# Patient Record
Sex: MALE | Employment: FULL TIME | URBAN - METROPOLITAN AREA
[De-identification: names, ages, dates, MRNs, and addresses within clinical notes are randomized per-mention and may not be internally consistent; named-entity substitution may affect disease eponyms.]

---

## 2018-12-20 ENCOUNTER — HOSPITAL ENCOUNTER (INPATIENT)
Age: 48
LOS: 3 days | Discharge: HOME | DRG: 871 | End: 2018-12-23
Attending: EMERGENCY MEDICINE | Admitting: HOSPITALIST
Payer: SELF-PAY

## 2018-12-20 ENCOUNTER — APPOINTMENT (OUTPATIENT)
Dept: GENERAL RADIOLOGY | Age: 48
DRG: 871 | End: 2018-12-20
Attending: EMERGENCY MEDICINE
Payer: SELF-PAY

## 2018-12-20 DIAGNOSIS — J18.9 COMMUNITY ACQUIRED PNEUMONIA OF RIGHT LOWER LOBE OF LUNG: Primary | ICD-10-CM

## 2018-12-20 LAB
ALBUMIN SERPL-MCNC: 3.1 G/DL (ref 3.5–5)
ALBUMIN/GLOB SERPL: 0.7 {RATIO} (ref 1.1–2.2)
ALP SERPL-CCNC: 109 U/L (ref 45–117)
ALT SERPL-CCNC: 37 U/L (ref 12–78)
ANION GAP SERPL CALC-SCNC: 7 MMOL/L (ref 5–15)
APPEARANCE UR: ABNORMAL
AST SERPL-CCNC: 29 U/L (ref 15–37)
BACTERIA URNS QL MICRO: NEGATIVE /HPF
BASOPHILS # BLD: 0 K/UL (ref 0–0.1)
BASOPHILS NFR BLD: 0 % (ref 0–1)
BILIRUB SERPL-MCNC: 0.8 MG/DL (ref 0.2–1)
BILIRUB UR QL: NEGATIVE
BUN SERPL-MCNC: 14 MG/DL (ref 6–20)
BUN/CREAT SERPL: 11 (ref 12–20)
CALCIUM SERPL-MCNC: 8.8 MG/DL (ref 8.5–10.1)
CHLORIDE SERPL-SCNC: 100 MMOL/L (ref 97–108)
CO2 SERPL-SCNC: 24 MMOL/L (ref 21–32)
COLOR UR: ABNORMAL
COMMENT, HOLDF: NORMAL
CREAT SERPL-MCNC: 1.29 MG/DL (ref 0.7–1.3)
DIFFERENTIAL METHOD BLD: ABNORMAL
EOSINOPHIL # BLD: 0 K/UL (ref 0–0.4)
EOSINOPHIL NFR BLD: 0 % (ref 0–7)
EPITH CASTS URNS QL MICRO: ABNORMAL /LPF
ERYTHROCYTE [DISTWIDTH] IN BLOOD BY AUTOMATED COUNT: 12.1 % (ref 11.5–14.5)
GLOBULIN SER CALC-MCNC: 4.7 G/DL (ref 2–4)
GLUCOSE SERPL-MCNC: 146 MG/DL (ref 65–100)
GLUCOSE UR STRIP.AUTO-MCNC: NEGATIVE MG/DL
HCT VFR BLD AUTO: 41.2 % (ref 36.6–50.3)
HGB BLD-MCNC: 13.7 G/DL (ref 12.1–17)
HGB UR QL STRIP: NEGATIVE
IMM GRANULOCYTES # BLD: 0 K/UL
IMM GRANULOCYTES NFR BLD AUTO: 0 %
KETONES UR QL STRIP.AUTO: NEGATIVE MG/DL
LEUKOCYTE ESTERASE UR QL STRIP.AUTO: NEGATIVE
LIPASE SERPL-CCNC: 101 U/L (ref 73–393)
LYMPHOCYTES # BLD: 0.7 K/UL (ref 0.8–3.5)
LYMPHOCYTES NFR BLD: 6 % (ref 12–49)
MCH RBC QN AUTO: 29.6 PG (ref 26–34)
MCHC RBC AUTO-ENTMCNC: 33.3 G/DL (ref 30–36.5)
MCV RBC AUTO: 89 FL (ref 80–99)
MONOCYTES # BLD: 0.8 K/UL (ref 0–1)
MONOCYTES NFR BLD: 7 % (ref 5–13)
NEUTS BAND NFR BLD MANUAL: 10 % (ref 0–6)
NEUTS SEG # BLD: 9.9 K/UL (ref 1.8–8)
NEUTS SEG NFR BLD: 77 % (ref 32–75)
NITRITE UR QL STRIP.AUTO: NEGATIVE
NRBC # BLD: 0 K/UL (ref 0–0.01)
NRBC BLD-RTO: 0 PER 100 WBC
PH UR STRIP: 7.5 [PH] (ref 5–8)
PLATELET # BLD AUTO: 177 K/UL (ref 150–400)
PMV BLD AUTO: 10.1 FL (ref 8.9–12.9)
POTASSIUM SERPL-SCNC: 4 MMOL/L (ref 3.5–5.1)
PROT SERPL-MCNC: 7.8 G/DL (ref 6.4–8.2)
PROT UR STRIP-MCNC: 30 MG/DL
RBC # BLD AUTO: 4.63 M/UL (ref 4.1–5.7)
RBC #/AREA URNS HPF: ABNORMAL /HPF (ref 0–5)
RBC MORPH BLD: ABNORMAL
SAMPLES BEING HELD,HOLD: NORMAL
SODIUM SERPL-SCNC: 131 MMOL/L (ref 136–145)
SP GR UR REFRACTOMETRY: 1.01 (ref 1–1.03)
TROPONIN I SERPL-MCNC: <0.05 NG/ML
UR CULT HOLD, URHOLD: NORMAL
UROBILINOGEN UR QL STRIP.AUTO: 1 EU/DL (ref 0.2–1)
WBC # BLD AUTO: 11.4 K/UL (ref 4.1–11.1)
WBC URNS QL MICRO: ABNORMAL /HPF (ref 0–4)

## 2018-12-20 PROCEDURE — 83690 ASSAY OF LIPASE: CPT

## 2018-12-20 PROCEDURE — 80053 COMPREHEN METABOLIC PANEL: CPT

## 2018-12-20 PROCEDURE — 99285 EMERGENCY DEPT VISIT HI MDM: CPT

## 2018-12-20 PROCEDURE — 96361 HYDRATE IV INFUSION ADD-ON: CPT

## 2018-12-20 PROCEDURE — 74011250637 HC RX REV CODE- 250/637: Performed by: EMERGENCY MEDICINE

## 2018-12-20 PROCEDURE — 85025 COMPLETE CBC W/AUTO DIFF WBC: CPT

## 2018-12-20 PROCEDURE — 96374 THER/PROPH/DIAG INJ IV PUSH: CPT

## 2018-12-20 PROCEDURE — 65270000029 HC RM PRIVATE

## 2018-12-20 PROCEDURE — 71046 X-RAY EXAM CHEST 2 VIEWS: CPT

## 2018-12-20 PROCEDURE — 84484 ASSAY OF TROPONIN QUANT: CPT

## 2018-12-20 PROCEDURE — 74011000250 HC RX REV CODE- 250: Performed by: EMERGENCY MEDICINE

## 2018-12-20 PROCEDURE — 74011250636 HC RX REV CODE- 250/636: Performed by: EMERGENCY MEDICINE

## 2018-12-20 PROCEDURE — 36415 COLL VENOUS BLD VENIPUNCTURE: CPT

## 2018-12-20 PROCEDURE — 93005 ELECTROCARDIOGRAM TRACING: CPT

## 2018-12-20 PROCEDURE — 94640 AIRWAY INHALATION TREATMENT: CPT

## 2018-12-20 PROCEDURE — 77030029684 HC NEB SM VOL KT MONA -A

## 2018-12-20 PROCEDURE — 74011000258 HC RX REV CODE- 258: Performed by: EMERGENCY MEDICINE

## 2018-12-20 PROCEDURE — 87040 BLOOD CULTURE FOR BACTERIA: CPT

## 2018-12-20 PROCEDURE — 83605 ASSAY OF LACTIC ACID: CPT

## 2018-12-20 PROCEDURE — 81001 URINALYSIS AUTO W/SCOPE: CPT

## 2018-12-20 RX ORDER — AZITHROMYCIN 250 MG/1
500 TABLET, FILM COATED ORAL
Status: COMPLETED | OUTPATIENT
Start: 2018-12-20 | End: 2018-12-20

## 2018-12-20 RX ORDER — ACETAMINOPHEN 325 MG/1
975 TABLET ORAL
Status: COMPLETED | OUTPATIENT
Start: 2018-12-20 | End: 2018-12-20

## 2018-12-20 RX ORDER — IBUPROFEN 200 MG
600 TABLET ORAL AS NEEDED
COMMUNITY

## 2018-12-20 RX ORDER — IBUPROFEN 600 MG/1
600 TABLET ORAL
Status: COMPLETED | OUTPATIENT
Start: 2018-12-20 | End: 2018-12-20

## 2018-12-20 RX ORDER — SODIUM CHLORIDE 0.9 % (FLUSH) 0.9 %
5-10 SYRINGE (ML) INJECTION AS NEEDED
Status: DISCONTINUED | OUTPATIENT
Start: 2018-12-20 | End: 2018-12-23 | Stop reason: HOSPADM

## 2018-12-20 RX ADMIN — AZITHROMYCIN 500 MG: 250 TABLET, FILM COATED ORAL at 21:18

## 2018-12-20 RX ADMIN — SODIUM CHLORIDE 1000 ML: 900 INJECTION, SOLUTION INTRAVENOUS at 21:10

## 2018-12-20 RX ADMIN — SODIUM CHLORIDE 1000 ML: 900 INJECTION, SOLUTION INTRAVENOUS at 22:18

## 2018-12-20 RX ADMIN — IBUPROFEN 600 MG: 600 TABLET, FILM COATED ORAL at 21:07

## 2018-12-20 RX ADMIN — SODIUM CHLORIDE 1000 ML: 900 INJECTION, SOLUTION INTRAVENOUS at 21:08

## 2018-12-20 RX ADMIN — ACETAMINOPHEN 975 MG: 325 TABLET, FILM COATED ORAL at 21:07

## 2018-12-20 RX ADMIN — CEFTRIAXONE 1 G: 1 INJECTION, POWDER, FOR SOLUTION INTRAMUSCULAR; INTRAVENOUS at 21:08

## 2018-12-20 RX ADMIN — ALBUTEROL SULFATE 1 DOSE: 2.5 SOLUTION RESPIRATORY (INHALATION) at 21:11

## 2018-12-21 LAB
ANION GAP SERPL CALC-SCNC: 8 MMOL/L (ref 5–15)
ATRIAL RATE: 111 BPM
BASOPHILS # BLD: 0 K/UL (ref 0–0.1)
BASOPHILS NFR BLD: 0 % (ref 0–1)
BUN SERPL-MCNC: 10 MG/DL (ref 6–20)
BUN/CREAT SERPL: 9 (ref 12–20)
CALCIUM SERPL-MCNC: 8.2 MG/DL (ref 8.5–10.1)
CALCULATED P AXIS, ECG09: 39 DEGREES
CALCULATED R AXIS, ECG10: 61 DEGREES
CALCULATED T AXIS, ECG11: 10 DEGREES
CHLORIDE SERPL-SCNC: 106 MMOL/L (ref 97–108)
CO2 SERPL-SCNC: 23 MMOL/L (ref 21–32)
CREAT SERPL-MCNC: 1.11 MG/DL (ref 0.7–1.3)
DIAGNOSIS, 93000: NORMAL
DIFFERENTIAL METHOD BLD: ABNORMAL
EOSINOPHIL # BLD: 0 K/UL (ref 0–0.4)
EOSINOPHIL NFR BLD: 0 % (ref 0–7)
ERYTHROCYTE [DISTWIDTH] IN BLOOD BY AUTOMATED COUNT: 12.4 % (ref 11.5–14.5)
FLUAV AG NPH QL IA: NEGATIVE
FLUBV AG NOSE QL IA: NEGATIVE
GLUCOSE SERPL-MCNC: 127 MG/DL (ref 65–100)
HCT VFR BLD AUTO: 40.8 % (ref 36.6–50.3)
HGB BLD-MCNC: 13.5 G/DL (ref 12.1–17)
IMM GRANULOCYTES # BLD: 0.1 K/UL (ref 0–0.04)
IMM GRANULOCYTES NFR BLD AUTO: 1 % (ref 0–0.5)
LACTATE BLD-SCNC: 1.18 MMOL/L (ref 0.4–2)
LYMPHOCYTES # BLD: 0.8 K/UL (ref 0.8–3.5)
LYMPHOCYTES NFR BLD: 8 % (ref 12–49)
MCH RBC QN AUTO: 29.2 PG (ref 26–34)
MCHC RBC AUTO-ENTMCNC: 33.1 G/DL (ref 30–36.5)
MCV RBC AUTO: 88.1 FL (ref 80–99)
MONOCYTES # BLD: 0.9 K/UL (ref 0–1)
MONOCYTES NFR BLD: 8 % (ref 5–13)
NEUTS SEG # BLD: 8.4 K/UL (ref 1.8–8)
NEUTS SEG NFR BLD: 83 % (ref 32–75)
NRBC # BLD: 0 K/UL (ref 0–0.01)
NRBC BLD-RTO: 0 PER 100 WBC
P-R INTERVAL, ECG05: 122 MS
PLATELET # BLD AUTO: 170 K/UL (ref 150–400)
PMV BLD AUTO: 10.3 FL (ref 8.9–12.9)
POTASSIUM SERPL-SCNC: 4.1 MMOL/L (ref 3.5–5.1)
Q-T INTERVAL, ECG07: 300 MS
QRS DURATION, ECG06: 84 MS
QTC CALCULATION (BEZET), ECG08: 408 MS
RBC # BLD AUTO: 4.63 M/UL (ref 4.1–5.7)
SODIUM SERPL-SCNC: 137 MMOL/L (ref 136–145)
VENTRICULAR RATE, ECG03: 111 BPM
WBC # BLD AUTO: 10.1 K/UL (ref 4.1–11.1)

## 2018-12-21 PROCEDURE — 77030009834 HC MSK O2 TRACH VYRM -A

## 2018-12-21 PROCEDURE — 36415 COLL VENOUS BLD VENIPUNCTURE: CPT

## 2018-12-21 PROCEDURE — 94640 AIRWAY INHALATION TREATMENT: CPT

## 2018-12-21 PROCEDURE — 80048 BASIC METABOLIC PNL TOTAL CA: CPT

## 2018-12-21 PROCEDURE — 65270000029 HC RM PRIVATE

## 2018-12-21 PROCEDURE — 77030029684 HC NEB SM VOL KT MONA -A

## 2018-12-21 PROCEDURE — 74011000250 HC RX REV CODE- 250: Performed by: HOSPITALIST

## 2018-12-21 PROCEDURE — 85025 COMPLETE CBC W/AUTO DIFF WBC: CPT

## 2018-12-21 PROCEDURE — 74011250636 HC RX REV CODE- 250/636: Performed by: HOSPITALIST

## 2018-12-21 PROCEDURE — 74011250637 HC RX REV CODE- 250/637: Performed by: INTERNAL MEDICINE

## 2018-12-21 PROCEDURE — 87804 INFLUENZA ASSAY W/OPTIC: CPT

## 2018-12-21 PROCEDURE — 74011000258 HC RX REV CODE- 258: Performed by: HOSPITALIST

## 2018-12-21 PROCEDURE — 74011250637 HC RX REV CODE- 250/637: Performed by: HOSPITALIST

## 2018-12-21 RX ORDER — SODIUM CHLORIDE 0.9 % (FLUSH) 0.9 %
5-10 SYRINGE (ML) INJECTION EVERY 8 HOURS
Status: DISCONTINUED | OUTPATIENT
Start: 2018-12-21 | End: 2018-12-23 | Stop reason: HOSPADM

## 2018-12-21 RX ORDER — IPRATROPIUM BROMIDE AND ALBUTEROL SULFATE 2.5; .5 MG/3ML; MG/3ML
3 SOLUTION RESPIRATORY (INHALATION)
Status: DISCONTINUED | OUTPATIENT
Start: 2018-12-21 | End: 2018-12-23 | Stop reason: HOSPADM

## 2018-12-21 RX ORDER — SODIUM CHLORIDE 9 MG/ML
100 INJECTION, SOLUTION INTRAVENOUS CONTINUOUS
Status: DISCONTINUED | OUTPATIENT
Start: 2018-12-21 | End: 2018-12-22

## 2018-12-21 RX ORDER — GUAIFENESIN 600 MG/1
600 TABLET, EXTENDED RELEASE ORAL EVERY 12 HOURS
Status: DISCONTINUED | OUTPATIENT
Start: 2018-12-21 | End: 2018-12-23 | Stop reason: HOSPADM

## 2018-12-21 RX ORDER — ENOXAPARIN SODIUM 100 MG/ML
40 INJECTION SUBCUTANEOUS EVERY 24 HOURS
Status: DISCONTINUED | OUTPATIENT
Start: 2018-12-21 | End: 2018-12-22

## 2018-12-21 RX ORDER — IBUPROFEN 600 MG/1
600 TABLET ORAL
Status: DISCONTINUED | OUTPATIENT
Start: 2018-12-21 | End: 2018-12-23 | Stop reason: HOSPADM

## 2018-12-21 RX ORDER — SODIUM CHLORIDE 0.9 % (FLUSH) 0.9 %
5-10 SYRINGE (ML) INJECTION AS NEEDED
Status: DISCONTINUED | OUTPATIENT
Start: 2018-12-21 | End: 2018-12-23 | Stop reason: HOSPADM

## 2018-12-21 RX ORDER — ACETAMINOPHEN 325 MG/1
650 TABLET ORAL
Status: DISCONTINUED | OUTPATIENT
Start: 2018-12-21 | End: 2018-12-21

## 2018-12-21 RX ADMIN — GUAIFENESIN 600 MG: 600 TABLET, EXTENDED RELEASE ORAL at 21:37

## 2018-12-21 RX ADMIN — Medication 10 ML: at 14:34

## 2018-12-21 RX ADMIN — GUAIFENESIN 600 MG: 600 TABLET, EXTENDED RELEASE ORAL at 09:19

## 2018-12-21 RX ADMIN — IPRATROPIUM BROMIDE AND ALBUTEROL SULFATE 3 ML: .5; 3 SOLUTION RESPIRATORY (INHALATION) at 16:35

## 2018-12-21 RX ADMIN — Medication 10 ML: at 06:42

## 2018-12-21 RX ADMIN — ENOXAPARIN SODIUM 40 MG: 40 INJECTION SUBCUTANEOUS at 09:17

## 2018-12-21 RX ADMIN — ACETAMINOPHEN 650 MG: 325 TABLET ORAL at 06:44

## 2018-12-21 RX ADMIN — Medication 10 ML: at 02:00

## 2018-12-21 RX ADMIN — ACETAMINOPHEN 650 MG: 325 TABLET ORAL at 16:18

## 2018-12-21 RX ADMIN — SODIUM CHLORIDE 100 ML/HR: 900 INJECTION, SOLUTION INTRAVENOUS at 02:00

## 2018-12-21 RX ADMIN — CEFTRIAXONE 1 G: 1 INJECTION, POWDER, FOR SOLUTION INTRAMUSCULAR; INTRAVENOUS at 21:37

## 2018-12-21 RX ADMIN — IBUPROFEN 600 MG: 600 TABLET ORAL at 19:34

## 2018-12-21 RX ADMIN — Medication 10 ML: at 21:37

## 2018-12-21 RX ADMIN — SODIUM CHLORIDE 100 ML/HR: 900 INJECTION, SOLUTION INTRAVENOUS at 14:34

## 2018-12-21 RX ADMIN — ACETAMINOPHEN 650 MG: 325 TABLET ORAL at 12:32

## 2018-12-21 RX ADMIN — AZITHROMYCIN MONOHYDRATE 500 MG: 500 INJECTION, POWDER, LYOPHILIZED, FOR SOLUTION INTRAVENOUS at 22:52

## 2018-12-21 NOTE — H&P
HISTORY AND PHYSICAL      PCP: None  History source: the patient      CC: fever      HPI: 53 y.o man w/ HTN and VANESA who presents with fever. Symptoms began 4 days ago when he noted a temperature of 100 F. This gradually progressed to 80 F and he went to an urgent care clinic. He had blood work done and was told it looked good and was sent home. He then developed some chest discomfort on the mid lower and right side of his chest. He denies cough. His fever kept getting worse and he started developing sweats. He denies dyspnea. There are no exacerbating or alleviating factors. He tried OTC medications for fever without relief. PMH/PSH:  Past Medical History:   Diagnosis Date    Hypertension     Sleep apnea      History reviewed. No pertinent surgical history. Home meds:   Prior to Admission medications    Medication Sig Start Date End Date Taking? Authorizing Provider   HYDROcodone-acetaminophen (NORCO) 7.5-325 mg per tablet Take 1 Tab by mouth every six (6) hours as needed for Pain. Max Daily Amount: 4 Tabs. 10/22/16   Archana Sanchez MD       Allergies:  No Known Allergies    FH:  Family History   Problem Relation Age of Onset    Heart Attack Mother     Cancer Father        SH:  Social History     Tobacco Use    Smoking status: Never Smoker    Smokeless tobacco: Never Used   Substance Use Topics    Alcohol use: No       ROS: A comprehensive review of systems was negative except for that written in the HPI.       PHYSICAL EXAM:  Visit Vitals  /59 (BP 1 Location: Right arm, BP Patient Position: At rest)   Pulse (!) 124   Temp (!) 103.1 °F (39.5 °C)   Resp 30   Ht 5' 6\" (1.676 m)   Wt 96.1 kg (211 lb 13.8 oz)   SpO2 93%   BMI 34.20 kg/m²       Gen: NAD, diaphoretic  HEENT: anicteric sclerae, normal conjunctiva, oropharynx clear, MM moist  Neck: supple, trachea midline, no adenopathy  Heart: RRR, no MRG, no JVD, no peripheral edema  Lungs: R basilar crackles, non-labored respirations  Abd: soft, NT, ND, BS+, no organomegaly  Extr: warm  Skin: diaphoretic, no rash  Neuro: CN II-XII grossly intact, normal speech, moves all extremities  Psych: normal mood, appropriate affect      Labs/Imaging:  Recent Results (from the past 24 hour(s))   EKG, 12 LEAD, INITIAL    Collection Time: 12/20/18  8:38 PM   Result Value Ref Range    Ventricular Rate 111 BPM    Atrial Rate 111 BPM    P-R Interval 122 ms    QRS Duration 84 ms    Q-T Interval 300 ms    QTC Calculation (Bezet) 408 ms    Calculated P Axis 39 degrees    Calculated R Axis 61 degrees    Calculated T Axis 10 degrees    Diagnosis       Sinus tachycardia  When compared with ECG of 22-OCT-2016 15:29,  No significant change was found     SAMPLES BEING HELD    Collection Time: 12/20/18  9:04 PM   Result Value Ref Range    SAMPLES BEING HELD 1RED 1BLUE     COMMENT        Add-on orders for these samples will be processed based on acceptable specimen integrity and analyte stability, which may vary by analyte. CBC WITH AUTOMATED DIFF    Collection Time: 12/20/18  9:04 PM   Result Value Ref Range    WBC 11.4 (H) 4.1 - 11.1 K/uL    RBC 4.63 4.10 - 5.70 M/uL    HGB 13.7 12.1 - 17.0 g/dL    HCT 41.2 36.6 - 50.3 %    MCV 89.0 80.0 - 99.0 FL    MCH 29.6 26.0 - 34.0 PG    MCHC 33.3 30.0 - 36.5 g/dL    RDW 12.1 11.5 - 14.5 %    PLATELET 529 643 - 910 K/uL    MPV 10.1 8.9 - 12.9 FL    NRBC 0.0 0  WBC    ABSOLUTE NRBC 0.00 0.00 - 0.01 K/uL    NEUTROPHILS 77 (H) 32 - 75 %    BAND NEUTROPHILS 10 (H) 0 - 6 %    LYMPHOCYTES 6 (L) 12 - 49 %    MONOCYTES 7 5 - 13 %    EOSINOPHILS 0 0 - 7 %    BASOPHILS 0 0 - 1 %    IMMATURE GRANULOCYTES 0 %    ABS. NEUTROPHILS 9.9 (H) 1.8 - 8.0 K/UL    ABS. LYMPHOCYTES 0.7 (L) 0.8 - 3.5 K/UL    ABS. MONOCYTES 0.8 0.0 - 1.0 K/UL    ABS. EOSINOPHILS 0.0 0.0 - 0.4 K/UL    ABS. BASOPHILS 0.0 0.0 - 0.1 K/UL    ABS. IMM.  GRANS. 0.0 K/UL    DF MANUAL      RBC COMMENTS NORMOCYTIC, NORMOCHROMIC     LIPASE    Collection Time: 12/20/18  9:04 PM Result Value Ref Range    Lipase 101 73 - 393 U/L   TROPONIN I    Collection Time: 12/20/18  9:04 PM   Result Value Ref Range    Troponin-I, Qt. <0.05 <2.74 ng/mL   METABOLIC PANEL, COMPREHENSIVE    Collection Time: 12/20/18  9:04 PM   Result Value Ref Range    Sodium 131 (L) 136 - 145 mmol/L    Potassium 4.0 3.5 - 5.1 mmol/L    Chloride 100 97 - 108 mmol/L    CO2 24 21 - 32 mmol/L    Anion gap 7 5 - 15 mmol/L    Glucose 146 (H) 65 - 100 mg/dL    BUN 14 6 - 20 MG/DL    Creatinine 1.29 0.70 - 1.30 MG/DL    BUN/Creatinine ratio 11 (L) 12 - 20      GFR est AA >60 >60 ml/min/1.73m2    GFR est non-AA 60 (L) >60 ml/min/1.73m2    Calcium 8.8 8.5 - 10.1 MG/DL    Bilirubin, total 0.8 0.2 - 1.0 MG/DL    ALT (SGPT) 37 12 - 78 U/L    AST (SGOT) 29 15 - 37 U/L    Alk. phosphatase 109 45 - 117 U/L    Protein, total 7.8 6.4 - 8.2 g/dL    Albumin 3.1 (L) 3.5 - 5.0 g/dL    Globulin 4.7 (H) 2.0 - 4.0 g/dL    A-G Ratio 0.7 (L) 1.1 - 2.2         Recent Labs     12/20/18 2104   WBC 11.4*   HGB 13.7   HCT 41.2        Recent Labs     12/20/18 2104   *   K 4.0      CO2 24   BUN 14   CREA 1.29   *   CA 8.8     Recent Labs     12/20/18 2104   SGOT 29   ALT 37      TBILI 0.8   TP 7.8   ALB 3.1*   GLOB 4.7*   LPSE 101       Recent Labs     12/20/18 2104   Maninder Ramos <0.05       No results for input(s): INR, PTP, APTT in the last 72 hours. No lab exists for component: INREXT     No results for input(s): PH, PCO2, PO2 in the last 72 hours. Xr Chest Pa Lat    Result Date: 12/20/2018  Impression: Right lower lobe infiltrate.            Assessment & Plan:     Bacterial CAP: (POA)   -IV ceftriaxone and azithromycin  -would avoid quinolone on discharge as he is very active and works a physically-demanding job (risk of tendon rupture)  -IV fluids  -f/u cultures    HTN: not on any meds  -monitor BP    VANESA    DVT ppx: sq Lovenox  Code status: full  Disposition: home when ready    Signed By: Augustus Glass MD December 20, 2018

## 2018-12-21 NOTE — PROGRESS NOTES
Primary Nurse Kem Blevins RN and Yuliana Randall RN performed a dual skin assessment on this patient No impairment noted    Bilateral heels dry and flaky; tattoo noted to L shoulder   Samuel score is 21

## 2018-12-21 NOTE — ED PROVIDER NOTES
53 y.o. male with past medical history significant for HTN and sleep apnea who presents from home with chief complaint of chest tightness. Patient states that he began experiencing tightness in his chest 4 days ago. He complains of an associated fever, SOB, and body aches. He visited [facility] for his symptoms 2 days ago and was informed that his blood work was [employer]. \"  He states that he had a fever of 100 degrees F at that time. Blood work from [facility] 2 days ago indicates that the patient had [place] [address], a hemoglobin of 15, and a platelet count of 791. He was diagnosed with a URI and pre-hypertension. Patient indicates that he was not given any medications for his symptoms. He states that his symptoms have progressively worsened over the past 2 days. He reports that he has had more severe body aches, SOB, and tightness within his chest.  He claims that his fever reached 103.8 degrees F while at home. In the ED, his temperature was measured to be 104.5 degrees F. Patient mentions that he took DayQuil for his symptoms about 5.5 hours ago with no subsequent relief. He denies cough, abdominal pain, nausea, vomiting, diarrhea, constipation, h/o asthma, and recent flu shot. There are no other acute medical concerns at this time. Social hx: Negative tobacco, alcohol, and drug use  PCP: None    Note written by Heaven José, as dictated by Yamel Jenkins DO 8:32 PM        The history is provided by the patient. No  was used. Past Medical History:   Diagnosis Date    Hypertension        No past surgical history on file. No family history on file.     Social History     Socioeconomic History    Marital status:      Spouse name: Not on file    Number of children: Not on file    Years of education: Not on file    Highest education level: Not on file   Social Needs    Financial resource strain: Not on file    Food insecurity - worry: Not on file    Food insecurity - inability: Not on file    Transportation needs - medical: Not on file   [employer] needs - non-medical: Not on file   Occupational History    Not on file   Tobacco Use    Smoking status: Never Smoker   Substance and Sexual Activity    Alcohol use: No    Drug use: No    Sexual activity: Not on file   Other Topics Concern    Not on file   Social History Narrative    Not on file         ALLERGIES: Patient has no known allergies. Review of Systems   Constitutional: Positive for fever. Respiratory: Positive for chest tightness and shortness of breath. Negative for cough. Gastrointestinal: Negative for abdominal pain, constipation, diarrhea, nausea and vomiting. Musculoskeletal: Positive for myalgias (generalized body aches). Negative for back pain and gait problem. Neurological: Negative for weakness, numbness and headaches. All other systems reviewed and are negative. Vitals:    12/20/18 2019 12/20/18 2036   BP:  (!) (P) 153/96   Pulse: (!) 134 (!) 112   Resp:  (P) 29   Temp:  (!) 104.5 °F (40.3 °C)   SpO2:  (P) 92%            Physical Exam        Constitutional: Pt is awake and alert. Pt appears well-developed and well-nourished. NAD. HENT:   Head: Normocephalic and atraumatic. Nose: Nose normal.   Mouth/Throat: Oropharynx is clear and moist. No oropharyngeal exudate. Eyes: Conjunctivae and extraocular motions are normal. Pupils are equal, round, and reactive to light. Right eye exhibits no discharge. Left eye exhibits no discharge. No scleral icterus. Neck: No tracheal deviation present. Supple neck. Cardiovascular: Tachycardia, regular rhythm, normal heart sounds and intact distal pulses. Exam reveals no gallop and no friction rub. No murmur heard. Pulmonary/Chest: Effort normal.  Pt has crackles present at right base. Abdominal: Soft. Pt  exhibits no distension and no mass. Pt exhibits tenderness at RUQ and epigastrium.  Pt  has no rebound and no guarding. Musculoskeletal:  Pt  exhibits no edema and no tenderness. Ext: Normal ROM in all four extremities; not tender to palpation; distal pulses are normal, no edema. Neurological:  Pt is alert. nonfocal neuro exam.  Skin: Skin is warm and dry. Pt  is not diaphoretic. Psychiatric:  Pt  has a normal mood and affect. Behavior is normal.     Note written by Heaven Iglesias, as dictated by Rui Tanner, DO 8:32 PM      MDM  Number of Diagnoses or Management Options  Critical Care  Total time providing critical care: 30-74 minutes          30 minutes    Procedures    ED EKG interpretation:  Rhythm: sinus tachycardia; Rate (approx.): 111 bpm; Axis: normal; ST/T wave: normal;   Note written by Heaven Iglesias, as dictated by Rui Tanner, DO 8:38 PM    PROGRESS NOTE:  9:00 PM  Reviewed patient's x-rays. Pt is 5'5\". His IBW is 136, 62kg. He works out a lot. 9:51 PM - lungs clear. Tachycardic. No resp distress. On my eval, pt was 89% on room air. Febrile. Neb. Iv and po abx. 10% bands. Has CAP    D/w Dr Digna Wu - will admit.

## 2018-12-21 NOTE — CDMP QUERY
Please clarify if this patient is (was) being treated/managed for:     => Sepsis (POA) 2/2 to bacterial community acquired pneumonia in the setting of 3 SIRS criteria requiring 3L fluid bolus, IV abx, and lab/culture monitoring  => Other explanation of clinical findings  => Clinically Undetermined (no explanation for clinical findings)    The medical record reflects the following:     Risk Factors:  Fevers almost 1-week prior to admission. Clinical Indicators:  Pt presented with 3-SIRS criteria (Temp 104 and 's, RR 29). Dx with bacterial community acquired PNA. Drop in sats on RA requiring oxygen therapy    Treatment:  3L fluid bolus, IV abx (Ceftriaxone & Azithromycin) and lab/culture monitoring, 2L NC    Please clarify and document your clinical opinion in the progress notes and discharge summary including the definitive and/or presumptive diagnosis, (suspected or probable), related to the above clinical findings. Please include clinical findings supporting your diagnosis.     Thank you,     Debi Baldwin RN, BSN, [facility], [address] [place]  [phone]

## 2018-12-21 NOTE — PROGRESS NOTES
TRANSFER - IN REPORT:    Verbal report received from Zara(name) on [facility]  being received from [facility](unit) for routine progression of care      Report consisted of patients Situation, Background, Assessment and   Recommendations(SBAR). Information from the following report(s) SBAR was reviewed with the receiving nurse. Opportunity for questions and clarification was provided.

## 2018-12-21 NOTE — PROGRESS NOTES
Hospitalist Progress Note      Hospital summary: 53 y.o man w/ HTN and VANESA who presents with fever. Symptoms began 4 days ago when he noted a temperature of 100 F. This gradually progressed to 80 F and he went to an urgent care clinic. He had blood work done and was told it looked good and was sent home. He then developed some chest discomfort on the mid lower and right side of his chest. He denies cough. His fever kept getting worse and he started developing sweats. He denies dyspnea. There are no exacerbating or alleviating factors. He tried OTC medications for fever without relief. 12/20/2018      Assessment/Plan:  Sepsis (POA) 2/2 to bacterial community acquired pneumonia  - in the setting of 3 SIRS criteria requiring 3L fluid bolus  - febrile, mild leucocytosis. Normal lactate  - CXR: Right lower lobe infiltrate. - Blood and sputum cx pending  - IVF  - c/w IV brooke and ceftriaxone  - legionella and s pneum pending    Hyponatremia POA  - resolved   - IVF    Code status: Full  DVT prophylaxis: Lovenox  Disposition: TBD. Home when stable  ----------------------------------------------    CC: Fever    S: Patient is seen and examined at bedside. Patient had fever this AM. He denied cough but feels congested. Review of Systems:  A comprehensive review of systems was negative.     O:  Visit Vitals  /89   Pulse 98   Temp (!) 100.6 °F (38.1 °C)   Resp 18   Ht 5' 6\" (1.676 m)   Wt 96.1 kg (211 lb 13.8 oz)   SpO2 92%   BMI 34.20 kg/m²       PHYSICAL EXAM:  Gen: NAD, non-toxic  HEENT: anicteric sclerae, normal conjunctiva, oropharynx clear, MM moist  Neck: supple, trachea midline, no adenopathy  Heart: RRR, no MRG, no JVD, no peripheral edema  Lungs: RLL base crackles  Abd: soft, NT, ND, BS+, no organomegaly  Extr: warm  Skin: dry, no rash  Neuro: CN II-XII grossly intact, normal speech, moves all extremities  Psych: normal mood, appropriate affect      Intake/Output Summary (Last 24 hours) at 12/21/2018 1333  Last data filed at 12/21/2018 0737  Gross per 24 hour   Intake 500 ml   Output    Net 500 ml        Recent labs & imaging reviewed:  Recent Results (from the past 24 hour(s))   EKG, 12 LEAD, INITIAL    Collection Time: 12/20/18  8:38 PM   Result Value Ref Range    Ventricular Rate 111 BPM    Atrial Rate 111 BPM    P-R Interval 122 ms    QRS Duration 84 ms    Q-T Interval 300 ms    QTC Calculation (Bezet) 408 ms    Calculated P Axis 39 degrees    Calculated R Axis 61 degrees    Calculated T Axis 10 degrees    Diagnosis       Sinus tachycardia  When compared with ECG of 22-OCT-2016 15:29,  No significant change was found  Confirmed by Ananya Brandt MD, Sanchez Fluke ([id number]) on 12/21/2018 12:36:36 PM     SAMPLES BEING HELD    Collection Time: 12/20/18  9:04 PM   Result Value Ref Range    SAMPLES BEING HELD 1RED 1BLUE     COMMENT        Add-on orders for these samples will be processed based on acceptable specimen integrity and analyte stability, which may vary by analyte. CBC WITH AUTOMATED DIFF    Collection Time: 12/20/18  9:04 PM   Result Value Ref Range    WBC 11.4 (H) 4.1 - 11.1 K/uL    RBC 4.63 4.10 - 5.70 M/uL    HGB 13.7 12.1 - 17.0 g/dL    HCT 41.2 36.6 - 50.3 %    MCV 89.0 80.0 - 99.0 FL    MCH 29.6 26.0 - 34.0 PG    MCHC 33.3 30.0 - 36.5 g/dL    RDW 12.1 11.5 - 14.5 %    PLATELET 452 486 - 325 K/uL    MPV 10.1 8.9 - 12.9 FL    NRBC 0.0 0  WBC    ABSOLUTE NRBC 0.00 0.00 - 0.01 K/uL    NEUTROPHILS 77 (H) 32 - 75 %    BAND NEUTROPHILS 10 (H) 0 - 6 %    LYMPHOCYTES 6 (L) 12 - 49 %    MONOCYTES 7 5 - 13 %    EOSINOPHILS 0 0 - 7 %    BASOPHILS 0 0 - 1 %    IMMATURE GRANULOCYTES 0 %    ABS. NEUTROPHILS 9.9 (H) 1.8 - 8.0 K/UL    ABS. LYMPHOCYTES 0.7 (L) 0.8 - 3.5 K/UL    ABS. MONOCYTES 0.8 0.0 - 1.0 K/UL    ABS. EOSINOPHILS 0.0 0.0 - 0.4 K/UL    ABS. BASOPHILS 0.0 0.0 - 0.1 K/UL    ABS. IMM.  GRANS. 0.0 K/UL    DF MANUAL      RBC COMMENTS NORMOCYTIC, NORMOCHROMIC     LIPASE    Collection Time: 12/20/18  9:04 PM   Result Value Ref Range    Lipase 101 73 - 393 U/L   CULTURE, BLOOD, PAIRED    Collection Time: 12/20/18  9:04 PM   Result Value Ref Range    Special Requests: NO SPECIAL REQUESTS      Culture result: NO GROWTH AFTER 7 HOURS     TROPONIN I    Collection Time: 12/20/18  9:04 PM   Result Value Ref Range    Troponin-I, Qt. <0.05 <7.46 ng/mL   METABOLIC PANEL, COMPREHENSIVE    Collection Time: 12/20/18  9:04 PM   Result Value Ref Range    Sodium 131 (L) 136 - 145 mmol/L    Potassium 4.0 3.5 - 5.1 mmol/L    Chloride 100 97 - 108 mmol/L    CO2 24 21 - 32 mmol/L    Anion gap 7 5 - 15 mmol/L    Glucose 146 (H) 65 - 100 mg/dL    BUN 14 6 - 20 MG/DL    Creatinine 1.29 0.70 - 1.30 MG/DL    BUN/Creatinine ratio 11 (L) 12 - 20      GFR est AA >60 >60 ml/min/1.73m2    GFR est non-AA 60 (L) >60 ml/min/1.73m2    Calcium 8.8 8.5 - 10.1 MG/DL    Bilirubin, total 0.8 0.2 - 1.0 MG/DL    ALT (SGPT) 37 12 - 78 U/L    AST (SGOT) 29 15 - 37 U/L    Alk.  phosphatase 109 45 - 117 U/L    Protein, total 7.8 6.4 - 8.2 g/dL    Albumin 3.1 (L) 3.5 - 5.0 g/dL    Globulin 4.7 (H) 2.0 - 4.0 g/dL    A-G Ratio 0.7 (L) 1.1 - 2.2     POC LACTIC ACID    Collection Time: 12/20/18  9:05 PM   Result Value Ref Range    Lactic Acid (POC) 1.18 0.40 - 2.00 mmol/L   URINALYSIS W/MICROSCOPIC    Collection Time: 12/20/18 11:11 PM   Result Value Ref Range    Color YELLOW/STRAW      Appearance TURBID (A) CLEAR      Specific gravity 1.013 1.003 - 1.030      pH (UA) 7.5 5.0 - 8.0      Protein 30 (A) NEG mg/dL    Glucose NEGATIVE  NEG mg/dL    Ketone NEGATIVE  NEG mg/dL    Bilirubin NEGATIVE  NEG      Blood NEGATIVE  NEG      Urobilinogen 1.0 0.2 - 1.0 EU/dL    Nitrites NEGATIVE  NEG      Leukocyte Esterase NEGATIVE  NEG      WBC 0-4 0 - 4 /hpf    RBC 0-5 0 - 5 /hpf    Epithelial cells FEW FEW /lpf    Bacteria NEGATIVE  NEG /hpf   URINE CULTURE HOLD SAMPLE    Collection Time: 12/20/18 11:11 PM   Result Value Ref Range    Urine culture hold URINE ON HOLD IN MICROBIOLOGY DEPT FOR 3 DAYS. IF UNPRESERVED URINE IS SUBMITTED, IT CANNOT BE USED FOR ADDITIONAL TESTING AFTER 24 HRS, RECOLLECTION WILL BE REQUIRED. INFLUENZA A & B AG (RAPID TEST)    Collection Time: 12/21/18  1:42 AM   Result Value Ref Range    Influenza A Antigen NEGATIVE  NEG      Influenza B Antigen NEGATIVE  NEG     CBC WITH AUTOMATED DIFF    Collection Time: 12/21/18 12:26 PM   Result Value Ref Range    WBC 10.1 4.1 - 11.1 K/uL    RBC 4.63 4.10 - 5.70 M/uL    HGB 13.5 12.1 - 17.0 g/dL    HCT 40.8 36.6 - 50.3 %    MCV 88.1 80.0 - 99.0 FL    MCH 29.2 26.0 - 34.0 PG    MCHC 33.1 30.0 - 36.5 g/dL    RDW 12.4 11.5 - 14.5 %    PLATELET 503 816 - 499 K/uL    MPV 10.3 8.9 - 12.9 FL    NRBC 0.0 0  WBC    ABSOLUTE NRBC 0.00 0.00 - 0.01 K/uL    NEUTROPHILS 83 (H) 32 - 75 %    LYMPHOCYTES 8 (L) 12 - 49 %    MONOCYTES 8 5 - 13 %    EOSINOPHILS 0 0 - 7 %    BASOPHILS 0 0 - 1 %    IMMATURE GRANULOCYTES 1 (H) 0.0 - 0.5 %    ABS. NEUTROPHILS 8.4 (H) 1.8 - 8.0 K/UL    ABS. LYMPHOCYTES 0.8 0.8 - 3.5 K/UL    ABS. MONOCYTES 0.9 0.0 - 1.0 K/UL    ABS. EOSINOPHILS 0.0 0.0 - 0.4 K/UL    ABS. BASOPHILS 0.0 0.0 - 0.1 K/UL    ABS. IMM. GRANS. 0.1 (H) 0.00 - 0.04 K/UL    DF AUTOMATED       Recent Labs     12/21/18  1226 12/20/18  2104   WBC 10.1 11.4*   HGB 13.5 13.7   HCT 40.8 41.2    177     Recent Labs     12/20/18 2104   *   K 4.0      CO2 24   BUN 14   CREA 1.29   *   CA 8.8     Recent Labs     12/20/18 2104   SGOT 29   ALT 37      TBILI 0.8   TP 7.8   ALB 3.1*   GLOB 4.7*   LPSE 101     No results for input(s): INR, PTP, APTT in the last 72 hours. No lab exists for component: INREXT   No results for input(s): FE, TIBC, PSAT, FERR in the last 72 hours. No results found for: FOL, RBCF   No results for input(s): PH, PCO2, PO2 in the last 72 hours.   Recent Labs     12/20/18  2104   TROIQ <0.05     No results found for: CHOL, CHOLX, CHLST, CHOLV, HDL, LDL, LDLC, DLDLP, TGLX, TRIGL, TRIGP, CHHD, CHHDX  No results found for: [place]  Lab Results   Component Value Date/Time    Color YELLOW/STRAW 12/20/2018 11:11 PM    Appearance TURBID (A) 12/20/2018 11:11 PM    Specific gravity 1.013 12/20/2018 11:11 PM    pH (UA) 7.5 12/20/2018 11:11 PM    Protein 30 (A) 12/20/2018 11:11 PM    Glucose NEGATIVE  12/20/2018 11:11 PM    Ketone NEGATIVE  12/20/2018 11:11 PM    Bilirubin NEGATIVE  12/20/2018 11:11 PM    Urobilinogen 1.0 12/20/2018 11:11 PM    Nitrites NEGATIVE  12/20/2018 11:11 PM    Leukocyte Esterase NEGATIVE  12/20/2018 11:11 PM    Epithelial cells FEW 12/20/2018 11:11 PM    Bacteria NEGATIVE  12/20/2018 11:11 PM    WBC 0-4 12/20/2018 11:11 PM    RBC 0-5 12/20/2018 11:11 PM       Med list reviewed  Current Facility-Administered Medications   Medication Dose Route Frequency    sodium chloride (NS) flush 5-10 mL  5-10 mL IntraVENous Q8H    sodium chloride (NS) flush 5-10 mL  5-10 mL IntraVENous PRN    cefTRIAXone (ROCEPHIN) 1 g in 0.9% sodium chloride (MBP/ADV) 50 mL  1 g IntraVENous Q24H    azithromycin (ZITHROMAX) 500 mg in 0.9% sodium chloride 250 mL ([id number])  500 mg IntraVENous Q24H    acetaminophen (TYLENOL) tablet 650 mg  650 mg Oral Q4H PRN    enoxaparin (LOVENOX) injection 40 mg  40 mg SubCUTAneous Q24H    0.9% sodium chloride infusion  100 mL/hr IntraVENous CONTINUOUS    albuterol-ipratropium (DUO-NEB) 2.5 MG-0.5 MG/3 ML  3 mL Nebulization Q4H PRN    guaiFENesin ER (MUCINEX) tablet 600 mg  600 mg Oral Q12H    sodium chloride (NS) flush 5-10 mL  5-10 mL IntraVENous PRN       Care Plan discussed with:  Patient/Family and Nurse    Sukumar Horton MD  Internal Medicine  Date of Service: 12/21/2018

## 2018-12-21 NOTE — ED NOTES
2038 Code Sepsis called    2036 O2 sats 92% on RA, pt placed on 2L NC and O2 sats up to 94%    2048 pt to xray    2052 pt back from xray    2106 POC Lactic 1.18    2118 pt drank 120ml water    2311 pt voided ~300ml     0053 TRANSFER - OUT REPORT:    Verbal report given to Harshal Fallon RN (name) on [employer]  being transferred to [address] (unit) for routine progression of care       Report consisted of patients Situation, Background, Assessment and   Recommendations(SBAR). Information from the following report(s) SBAR and ED Summary was reviewed with the receiving nurse. Lines:   Peripheral IV 12/20/18 Right Antecubital (Active)   Site Assessment Clean, dry, & intact 12/20/2018  9:17 PM   Phlebitis Assessment 0 12/20/2018  9:17 PM   Infiltration Assessment 0 12/20/2018  9:17 PM   Dressing Status Clean, dry, & intact 12/20/2018  9:17 PM   Dressing Type Transparent 12/20/2018  9:17 PM   Hub Color/Line Status Green 12/20/2018  9:17 PM   Action Taken Catheter retaped 12/20/2018  9:17 PM   Alcohol Cap Used Yes 12/20/2018  9:17 PM       Peripheral IV 12/20/18 Left Antecubital (Active)   Site Assessment Clean, dry, & intact 12/20/2018  9:17 PM   Phlebitis Assessment 0 12/20/2018  9:17 PM   Infiltration Assessment 0 12/20/2018  9:17 PM   Dressing Status Clean, dry, & intact 12/20/2018  9:17 PM   Dressing Type Transparent 12/20/2018  9:17 PM   Hub Color/Line Status Green 12/20/2018  9:17 PM   Action Taken Catheter retaped 12/20/2018  9:17 PM   Alcohol Cap Used Yes 12/20/2018  9:17 PM        Opportunity for questions and clarification was provided.

## 2018-12-21 NOTE — PROGRESS NOTES
Admission Medication Reconciliation:    Information obtained from: Patient    Significant PMH/Disease States:   Past Medical History:   Diagnosis Date    Hypertension     Sleep apnea        Chief Complaint for this Admission:    Chief Complaint   Patient presents with    Fever    Shortness of Breath       Allergies:  Patient has no known allergies. Prior to Admission Medications:   Prior to Admission Medications   Prescriptions Last Dose Informant Patient Reported? Taking? Phenylephrine-DM-Acetaminophen (VICKS DAYQUIL COLD-FLU RELIEF) 5- mg/15 mL liqd 12/20/2018 at 1500  Yes Yes   Sig: Take 30 mL by mouth every four (4) hours as needed. ibuprofen (ADVIL) 200 mg tablet   Yes Yes   Sig: Take 600 mg by mouth as needed for Pain. Facility-Administered Medications: None         Comments/Recommendations:     Spoke with patient regarding allergies and PTA medications. 1) Confirmed NKDA. 2) Updated PTA medication list. Added Dayquil and Advil. Removed Norco.     Regarding Dayquil, the patient reports he has been taking it per the bottle instructions. Patient additionally reports to taking a protein powder supplement, however he has not been taking while he has been sick. Last dose information listed in table above.       Vanessa Fu, PharmD

## 2018-12-21 NOTE — PROGRESS NOTES
Problem: Pneumonia: Day 1  Goal: *Oxygen saturation within defined limits  Outcome: Progressing Towards Goal  O2 saturation in 90s'

## 2018-12-21 NOTE — ED TRIAGE NOTES
Pt arrives ambulatory from home c/o chest tightness, SOB, high fevers since Sunday with highest 103.8, denies cough, he went to [facility] Sunday and they sent him home with no medicine, positive for body and muscle aches, pt reports he has a known arrythmia but unsure of which; Dr. Mackenzie Wick @ bedside; pt reports taking Dayquil @ 1500 today

## 2018-12-22 PROCEDURE — 74011250636 HC RX REV CODE- 250/636: Performed by: HOSPITALIST

## 2018-12-22 PROCEDURE — 74011250637 HC RX REV CODE- 250/637: Performed by: INTERNAL MEDICINE

## 2018-12-22 PROCEDURE — 74011000258 HC RX REV CODE- 258: Performed by: HOSPITALIST

## 2018-12-22 PROCEDURE — 65270000029 HC RM PRIVATE

## 2018-12-22 RX ADMIN — SODIUM CHLORIDE 100 ML/HR: 900 INJECTION, SOLUTION INTRAVENOUS at 05:51

## 2018-12-22 RX ADMIN — Medication 10 ML: at 05:51

## 2018-12-22 RX ADMIN — GUAIFENESIN 600 MG: 600 TABLET, EXTENDED RELEASE ORAL at 09:15

## 2018-12-22 RX ADMIN — AZITHROMYCIN MONOHYDRATE 500 MG: 500 INJECTION, POWDER, LYOPHILIZED, FOR SOLUTION INTRAVENOUS at 22:00

## 2018-12-22 RX ADMIN — IBUPROFEN 600 MG: 600 TABLET ORAL at 09:15

## 2018-12-22 RX ADMIN — Medication 10 ML: at 22:19

## 2018-12-22 RX ADMIN — GUAIFENESIN 600 MG: 600 TABLET, EXTENDED RELEASE ORAL at 20:40

## 2018-12-22 RX ADMIN — CEFTRIAXONE 1 G: 1 INJECTION, POWDER, FOR SOLUTION INTRAMUSCULAR; INTRAVENOUS at 20:40

## 2018-12-22 RX ADMIN — Medication 10 ML: at 14:28

## 2018-12-22 RX ADMIN — IBUPROFEN 600 MG: 600 TABLET ORAL at 20:40

## 2018-12-22 NOTE — PROGRESS NOTES
Problem: General Medical Care Plan  Goal: *Vital signs within specified parameters  Outcome: Progressing Towards Goal  Monitoring of vitals signs every 8 hours. Close monitoring on temperature.

## 2018-12-22 NOTE — PROGRESS NOTES
Spiritual Care Partner Volunteer visited patient in room 623/01 on 12.22.18. Documented by: : Rev. Marva Hebert.  Vidal Gloria; [facility], to contact [phone] [address] call: [phone]

## 2018-12-22 NOTE — PROGRESS NOTES
Hospitalist Progress Note      Hospital summary: 53 y.o man w/ HTN and VANESA who presents with fever. Symptoms began 4 days ago when he noted a temperature of 100 F. This gradually progressed to 80 F and he went to an urgent care clinic. He had blood work done and was told it looked good and was sent home. He then developed some chest discomfort on the mid lower and right side of his chest. He denies cough. His fever kept getting worse and he started developing sweats. He denies dyspnea. There are no exacerbating or alleviating factors. He tried OTC medications for fever without relief. 12/20/2018      Assessment/Plan:  Sepsis (POA) 2/2 to bacterial community acquired pneumonia  - in the setting of 3 SIRS criteria requiring 3L fluid bolus  - febrile, mild leucocytosis. Normal lactate POA  - Flu negative  - CXR: Right lower lobe infiltrate. - Blood cx NGTD  - legionella and s pneum pending  - c/w IV brooke and ceftriaxone    Hyponatremia POA  - resolved     Code status: Full  DVT prophylaxis: not indicated as pt is mobile  Disposition: TBD. Home when stable, possible tomorrow  ----------------------------------------------    CC: Fever    S: Patient is seen and examined at bedside. Patient had fever last night. He feels better. No cough. Review of Systems:  A comprehensive review of systems was negative.     O:  Visit Vitals  /89 (BP 1 Location: Right arm, BP Patient Position: Sitting)   Pulse 90   Temp 99.7 °F (37.6 °C)   Resp 18   Ht 5' 6\" (1.676 m)   Wt 96.1 kg (211 lb 13.8 oz)   SpO2 96%   BMI 34.20 kg/m²       PHYSICAL EXAM:  Gen: NAD, non-toxic  HEENT: anicteric sclerae, normal conjunctiva, oropharynx clear, MM moist  Neck: supple, trachea midline, no adenopathy  Heart: RRR, no MRG, no JVD, no peripheral edema  Lungs: RLL base crackles  Abd: soft, NT, ND, BS+, no organomegaly  Extr: warm  Skin: dry, no rash  Neuro: CN II-XII grossly intact, normal speech, moves all extremities  Psych: normal mood, appropriate affect    No intake or output data in the 24 hours ending 12/22/18 1007     Recent labs & imaging reviewed:  Recent Results (from the past 24 hour(s))   CBC WITH AUTOMATED DIFF    Collection Time: 12/21/18 12:26 PM   Result Value Ref Range    WBC 10.1 4.1 - 11.1 K/uL    RBC 4.63 4.10 - 5.70 M/uL    HGB 13.5 12.1 - 17.0 g/dL    HCT 40.8 36.6 - 50.3 %    MCV 88.1 80.0 - 99.0 FL    MCH 29.2 26.0 - 34.0 PG    MCHC 33.1 30.0 - 36.5 g/dL    RDW 12.4 11.5 - 14.5 %    PLATELET 873 413 - 127 K/uL    MPV 10.3 8.9 - 12.9 FL    NRBC 0.0 0  WBC    ABSOLUTE NRBC 0.00 0.00 - 0.01 K/uL    NEUTROPHILS 83 (H) 32 - 75 %    LYMPHOCYTES 8 (L) 12 - 49 %    MONOCYTES 8 5 - 13 %    EOSINOPHILS 0 0 - 7 %    BASOPHILS 0 0 - 1 %    IMMATURE GRANULOCYTES 1 (H) 0.0 - 0.5 %    ABS. NEUTROPHILS 8.4 (H) 1.8 - 8.0 K/UL    ABS. LYMPHOCYTES 0.8 0.8 - 3.5 K/UL    ABS. MONOCYTES 0.9 0.0 - 1.0 K/UL    ABS. EOSINOPHILS 0.0 0.0 - 0.4 K/UL    ABS. BASOPHILS 0.0 0.0 - 0.1 K/UL    ABS. IMM.  GRANS. 0.1 (H) 0.00 - 0.04 K/UL    DF AUTOMATED     METABOLIC PANEL, BASIC    Collection Time: 12/21/18 12:26 PM   Result Value Ref Range    Sodium 137 136 - 145 mmol/L    Potassium 4.1 3.5 - 5.1 mmol/L    Chloride 106 97 - 108 mmol/L    CO2 23 21 - 32 mmol/L    Anion gap 8 5 - 15 mmol/L    Glucose 127 (H) 65 - 100 mg/dL    BUN 10 6 - 20 MG/DL    Creatinine 1.11 0.70 - 1.30 MG/DL    BUN/Creatinine ratio 9 (L) 12 - 20      GFR est AA >60 >60 ml/min/1.73m2    GFR est non-AA >60 >60 ml/min/1.73m2    Calcium 8.2 (L) 8.5 - 10.1 MG/DL     Recent Labs     12/21/18  1226 12/20/18  2104   WBC 10.1 11.4*   HGB 13.5 13.7   HCT 40.8 41.2    177     Recent Labs     12/21/18  1226 12/20/18  2104    131*   K 4.1 4.0    100   CO2 23 24   BUN 10 14   CREA 1.11 1.29   * 146*   CA 8.2* 8.8     Recent Labs     12/20/18  2104   SGOT 29   ALT 37      TBILI 0.8   TP 7.8   ALB 3.1*   GLOB 4.7*   LPSE 101     No results for input(s): INR, PTP, APTT in the last 72 hours. No lab exists for component: INREXT, INREXT   No results for input(s): FE, TIBC, PSAT, FERR in the last 72 hours. No results found for: FOL, RBCF   No results for input(s): PH, PCO2, PO2 in the last 72 hours.   Recent Labs     12/20/18 2104   TROIQ <0.05     No results found for: CHOL, CHOLX, CHLST, CHOLV, HDL, LDL, LDLC, DLDLP, TGLX, TRIGL, TRIGP, CHHD, CHHDX  No results found for: Julian Mancilla  Lab Results   Component Value Date/Time    Color YELLOW/STRAW 12/20/2018 11:11 PM    Appearance TURBID (A) 12/20/2018 11:11 PM    Specific gravity 1.013 12/20/2018 11:11 PM    pH (UA) 7.5 12/20/2018 11:11 PM    Protein 30 (A) 12/20/2018 11:11 PM    Glucose NEGATIVE  12/20/2018 11:11 PM    Ketone NEGATIVE  12/20/2018 11:11 PM    Bilirubin NEGATIVE  12/20/2018 11:11 PM    Urobilinogen 1.0 12/20/2018 11:11 PM    Nitrites NEGATIVE  12/20/2018 11:11 PM    Leukocyte Esterase NEGATIVE  12/20/2018 11:11 PM    Epithelial cells FEW 12/20/2018 11:11 PM    Bacteria NEGATIVE  12/20/2018 11:11 PM    WBC 0-4 12/20/2018 11:11 PM    RBC 0-5 12/20/2018 11:11 PM       Med list reviewed  Current Facility-Administered Medications   Medication Dose Route Frequency    sodium chloride (NS) flush 5-10 mL  5-10 mL IntraVENous Q8H    sodium chloride (NS) flush 5-10 mL  5-10 mL IntraVENous PRN    cefTRIAXone (ROCEPHIN) 1 g in 0.9% sodium chloride (MBP/ADV) 50 mL  1 g IntraVENous Q24H    azithromycin (ZITHROMAX) 500 mg in 0.9% sodium chloride 250 mL ([id number])  500 mg IntraVENous Q24H    albuterol-ipratropium (DUO-NEB) 2.5 MG-0.5 MG/3 ML  3 mL Nebulization Q4H PRN    guaiFENesin ER (MUCINEX) tablet 600 mg  600 mg Oral Q12H    ibuprofen (MOTRIN) tablet 600 mg  600 mg Oral Q6H PRN    sodium chloride (NS) flush 5-10 mL  5-10 mL IntraVENous PRN       Care Plan discussed with:  Patient/Family and Nurse    Walter Luna MD  Internal Medicine  Date of Service: 12/22/2018

## 2018-12-23 VITALS
SYSTOLIC BLOOD PRESSURE: 142 MMHG | WEIGHT: 211.86 LBS | BODY MASS INDEX: 34.05 KG/M2 | RESPIRATION RATE: 17 BRPM | OXYGEN SATURATION: 97 % | HEIGHT: 66 IN | HEART RATE: 74 BPM | DIASTOLIC BLOOD PRESSURE: 91 MMHG | TEMPERATURE: 98.2 F

## 2018-12-23 PROCEDURE — 87899 AGENT NOS ASSAY W/OPTIC: CPT

## 2018-12-23 PROCEDURE — 87449 NOS EACH ORGANISM AG IA: CPT

## 2018-12-23 PROCEDURE — 74011250637 HC RX REV CODE- 250/637: Performed by: INTERNAL MEDICINE

## 2018-12-23 PROCEDURE — 36415 COLL VENOUS BLD VENIPUNCTURE: CPT

## 2018-12-23 RX ORDER — AZITHROMYCIN 500 MG/1
500 TABLET, FILM COATED ORAL DAILY
Qty: 3 TAB | Refills: 0 | Status: SHIPPED | OUTPATIENT
Start: 2018-12-23 | End: 2018-12-25

## 2018-12-23 RX ORDER — AMOXICILLIN 875 MG/1
875 TABLET, FILM COATED ORAL 2 TIMES DAILY
Qty: 6 TAB | Refills: 0 | Status: SHIPPED | OUTPATIENT
Start: 2018-12-23 | End: 2018-12-26

## 2018-12-23 RX ADMIN — GUAIFENESIN 600 MG: 600 TABLET, EXTENDED RELEASE ORAL at 09:01

## 2018-12-23 RX ADMIN — Medication 10 ML: at 05:07

## 2018-12-23 NOTE — DISCHARGE INSTRUCTIONS
Pneumonia: Care Instructions  Your Care Instructions    Pneumonia is an infection of the lungs. Most cases are caused by infections from bacteria or viruses. Pneumonia may be mild or very severe. If it is caused by bacteria, you will be treated with antibiotics. It may take a few weeks to a few months to recover fully from pneumonia, depending on how sick you were and whether your overall health is good. Follow-up care is a key part of your treatment and safety. Be sure to make and go to all appointments, and call your doctor if you are having problems. It's also a good idea to know your test results and keep a list of the medicines you take. How can you care for yourself at home? · Take your antibiotics exactly as directed. Do not stop taking the medicine just because you are feeling better. You need to take the full course of antibiotics. · Take your medicines exactly as prescribed. Call your doctor if you think you are having a problem with your medicine. · Get plenty of rest and sleep. You may feel weak and tired for a while, but your energy level will improve with time. · To prevent dehydration, drink plenty of fluids, enough so that your urine is light yellow or clear like water. Choose water and other caffeine-free clear liquids until you feel better. If you have kidney, heart, or liver disease and have to limit fluids, talk with your doctor before you increase the amount of fluids you drink. · Take care of your cough so you can rest. A cough that brings up mucus from your lungs is common with pneumonia. It is one way your body gets rid of the infection. But if coughing keeps you from resting or causes severe fatigue and chest-wall pain, talk to your doctor. He or she may suggest that you take a medicine to reduce the cough. · Use a vaporizer or humidifier to add moisture to your bedroom. Follow the directions for cleaning the machine. · Do not smoke or allow others to smoke around you.  Smoke will make your cough last longer. If you need help quitting, talk to your doctor about stop-smoking programs and medicines. These can increase your chances of quitting for good. · Take an over-the-counter pain medicine, such as acetaminophen (Tylenol), ibuprofen (Advil, Motrin), or naproxen (Aleve). Read and follow all instructions on the label. · Do not take two or more pain medicines at the same time unless the doctor told you to. Many pain medicines have acetaminophen, which is Tylenol. Too much acetaminophen (Tylenol) can be harmful. · If you were given a spirometer to measure how well your lungs are working, use it as instructed. This can help your doctor tell how your recovery is going. · To prevent pneumonia in the future, talk to your doctor about getting a flu vaccine (once a year) and a pneumococcal vaccine (one time only for most people). When should you call for help? Call 911 anytime you think you may need emergency care. For example, call if:    · You have severe trouble breathing.    Call your doctor now or seek immediate medical care if:    · You cough up dark brown or bloody mucus (sputum).     · You have new or worse trouble breathing.     · You are dizzy or lightheaded, or you feel like you may faint.    Watch closely for changes in your health, and be sure to contact your doctor if:    · You have a new or higher fever.     · You are coughing more deeply or more often.     · You are not getting better after 2 days (48 hours).     · You do not get better as expected. Where can you learn more? Go to http://stan-bernardino.info/. Enter [id number] in the search box to learn more about \"Pneumonia: Care Instructions. \"  Current as of: December 6, 2017  Content Version: 11.8  © 4924-5666 [employer]. Care instructions adapted under license by [employer] (which disclaims liability or warranty for this information).  If you have questions about a medical condition or this instruction, always ask your healthcare professional. [address] any warranty or liability for your use of this information. Please bring this form with you to show your primary care provider at your follow-up appointment. Primary care provider:  Dr. Melissa Silva    Discharging provider:  Malik Ring MD    You have been admitted to the hospital with the following diagnoses:  · Pneumonia    FOLLOW-UP CARE RECOMMENDATIONS:  Advise to complete antibiotics    APPOINTMENTS:  Advised to make an appt with new PCP soon. ( insurance starts from 01/2019)    FOLLOW-UP TESTS recommended: CXR    PENDING TEST RESULTS:  At the time of your discharge the following test results are still pending: none  Please make sure you review these results with your outpatient follow-up provider(s). SYMPTOMS to watch for: chest pain, shortness of breath, fever, chills, nausea, vomiting, diarrhea, change in mentation, falling, weakness, bleeding. DIET/what to eat:  Regular Diet    ACTIVITY:  Activity as tolerated      What to do if new or unexpected symptoms occur? If you experience any of the above symptoms (or should other concerns or questions arise after discharge) please call your primary care physician. Return to the emergency room if you cannot get hold of your doctor. · It is very important that you keep your follow-up appointment(s). · Please bring discharge papers, medication list (and/or medication bottles) to your follow-up appointments for review by your outpatient provider(s). · Please check the list of medications and be sure it includes every medication (even non-prescription medications) that your provider wants you to take. · It is important that you take the medication exactly as they are prescribed. · Keep your medication in the bottles provided by the pharmacist and keep a list of the medication names, dosages, and times to be taken in your wallet.    · Do not take other medications without consulting your doctor. · If you have any questions about your medications or other instructions, please talk to your nurse or care provider before you leave the hospital.    I understand that if any problems occur once I am at home I am to contact my physician. These instructions were explained to me and I had the opportunity to ask questions.

## 2018-12-23 NOTE — PROGRESS NOTES
12/22/18 2024   Vitals   Temp (!) 101.1 °F (38.4 °C)   Temp Source Oral   Pulse (Heart Rate) 92   Heart Rate Source Monitor   Resp Rate 17   O2 Sat (%) 95 %   Level of Consciousness Alert   /77   MAP (Calculated) 98   BP 1 Location Right arm   BP 1 Method Automatic   BP Patient Position At rest   MEWS Score 3   Oxygen Therapy   O2 Device Room air   2120- MEWS score 3. Dr. Magui Moses notified. No new orders. 2127- pt c/o bout previous dose of azithromycin caused burning at R arm which was the arm the atx was running through and c/o of abdominal discomfort. Notified pharmacy and MD to r/o drug allergy. Pharmacist said it is a common side effect and to run the abx at a slower rate. Will continue to monitor. 2304- Pt c/o of pain at R arm. No abdominal discomfort. Stopped IV atx azithromycin. Will notify day nurse and MD and will continue to monitor.

## 2018-12-23 NOTE — DISCHARGE SUMMARY
Discharge Summary     Patient:  Sally Rodriguez       MRN: [id number]       YOB: 1970       Age: 53 y.o. Date of admission:  12/20/2018    Date of discharge:  12/23/2018    Primary care provider: Dr. Scotty Cook provider:  Shine Bernal MD    Discharging provider:  Larry Betts [address] [phone]. If unavailable, call [phone] and ask the  to page the triage hospitalist.    Consultations  · IP CONSULT TO HOSPITALIST    Procedures  · * No surgery found *    Discharge destination: home. The patient is stable for discharge. Admission diagnosis  · Pneumonia    Current Discharge Medication List      START taking these medications    Details   azithromycin (ZITHROMAX) 500 mg tab Take 1 Tab by mouth daily for 2 days. Qty: 3 Tab, Refills: 0      amoxicillin (AMOXIL) 875 mg tablet Take 1 Tab by mouth two (2) times a day for 3 days. Qty: 6 Tab, Refills: 0         CONTINUE these medications which have NOT CHANGED    Details   ibuprofen (ADVIL) 200 mg tablet Take 600 mg by mouth as needed for Pain. STOP taking these medications       Phenylephrine-DM-Acetaminophen (VICKS DAYQUIL COLD-FLU RELIEF) 5- mg/15 mL liqd Comments:   Reason for Stopping: Follow-up Information     Follow up With Specialties Details Why Contact Info    None    None ([phone]) Patient stated that they have no PCP            Final discharge diagnoses and brief hospital course  53 y.o man w/ HTN and VANESA who presents with fever. Symptoms began 4 days ago when he noted a temperature of 100 F. This gradually progressed to 80 F and he went to an urgent care clinic. He had blood work done and was told it looked good and was sent home. He then developed some chest discomfort on the mid lower and right side of his chest. He denies cough.  His fever kept getting worse and he started developing sweats. He denies dyspnea. There are no exacerbating or alleviating factors. He tried OTC medications for fever without relief. Sepsis (POA) 2/2 to bacterial community acquired pneumonia  - in the setting of 3 SIRS criteria requiring 3L fluid bolus  - febrile, mild leucocytosis. Normal lactate    - Blood cx NGTD. Patient has no sputum production  - c/w IV brooke and ceftriaxone  - legionella and s pneum pending  - patient had fever spike last night but patient looks clinically better. - discharged on po Abx amoxicillin and azithromycin   - recommend CXR in1 week    Hyponatremia POA  - resolved on IVF    Recommendations:  Patient has new insurance which will start on 1/2019 and he is ready set up with new PCP  PO abx  CXR in 1 week, script given      Physical examination at discharge  Visit Vitals  BP (!) 142/91 (BP 1 Location: Right arm, BP Patient Position: At rest;Supine)   Pulse 74   Temp 98.2 °F (36.8 °C)   Resp 17   Ht 5' 6\" (1.676 m)   Wt 96.1 kg (211 lb 13.8 oz)   SpO2 97%   BMI 34.20 kg/m²     Gen: NAD, non-toxic  HEENT: anicteric sclerae, normal conjunctiva, oropharynx clear, MM moist  Neck: supple, trachea midline, no adenopathy  Heart: RRR, no MRG, no JVD, no peripheral edema  Lungs: RLL base crackles  Abd: soft, NT, ND, BS+, no organomegaly  Extr: warm  Skin: dry, no rash  Neuro: CN II-XII grossly intact, normal speech, moves all extremities  Psych: normal mood, appropriate affect        Pertinent imaging studies:    - CXR: Right lower lobe infiltrate. Recent Labs     12/21/18  1226 12/20/18  2104   WBC 10.1 11.4*   HGB 13.5 13.7   HCT 40.8 41.2    177     Recent Labs     12/21/18  1226 12/20/18  2104    131*   K 4.1 4.0    100   CO2 23 24   BUN 10 14   CREA 1.11 1.29   * 146*   CA 8.2* 8.8     Recent Labs     12/20/18  2104   SGOT 29      TP 7.8   ALB 3.1*   GLOB 4.7*   LPSE 101     No results for input(s): INR, PTP, APTT in the last 72 hours.     No lab exists for component: INREXT   No results for input(s): FE, TIBC, PSAT, FERR in the last 72 hours. No results for input(s): PH, PCO2, PO2 in the last 72 hours. No results for input(s): CPK, CKMB in the last 72 hours. No lab exists for component: TROPONINI  No components found for: [place]    Chronic Diagnoses:    Problem List as of 12/23/2018 Never Reviewed          Codes Class Noted - Resolved    * (Principal) Pneumonia ICD-10-CM: J18.9  ICD-9-CM: 438  12/20/2018 - Present              Time spent on discharge related activities today greater than 30 minutes.       Signed:  Marleen Bonds MD                 Hospitalist, Internal Medicine      Cc: None

## 2018-12-25 LAB
BACTERIA SPEC CULT: NORMAL
SERVICE CMNT-IMP: NORMAL

## 2018-12-27 LAB
L PNEUMO1 AG UR QL IA: POSITIVE
SPECIMEN SOURCE: NORMAL

## 2018-12-27 NOTE — PROGRESS NOTES
I accessed this chart as I received call from [facility] inquiring the doctor to be called for a positive urine legionella test.I informed discharging hospitalist Dr Ginger Tom via [employer]. Dr Quirino Sears confirmed receiving the information that she had tried to call patient previously and said he was sent on azithromycin that would cover the above organism     Blue Tijerina MD

## 2018-12-29 LAB — S PNEUM AG SPEC QL LA: NORMAL

## 2019-05-02 ENCOUNTER — OFFICE VISIT (OUTPATIENT)
Dept: PRIMARY CARE CLINIC | Age: 49
End: 2019-05-02

## 2019-05-02 VITALS
SYSTOLIC BLOOD PRESSURE: 131 MMHG | OXYGEN SATURATION: 99 % | WEIGHT: 208.2 LBS | RESPIRATION RATE: 16 BRPM | HEIGHT: 66 IN | BODY MASS INDEX: 33.46 KG/M2 | HEART RATE: 62 BPM | TEMPERATURE: 98.1 F | DIASTOLIC BLOOD PRESSURE: 87 MMHG

## 2019-05-02 DIAGNOSIS — Z00.00 ENCOUNTER FOR WELL ADULT EXAM WITHOUT ABNORMAL FINDINGS: Primary | ICD-10-CM

## 2019-05-02 DIAGNOSIS — Z86.79 HISTORY OF ESSENTIAL HYPERTENSION: ICD-10-CM

## 2019-05-02 NOTE — PROGRESS NOTES
Chief Complaint Patient presents with [place] Establish Care  Complete Physical  
  fasting 1. Have you been to the ER, urgent care clinic since your last visit? Hospitalized since your last visit? No 
 
2. Have you seen or consulted any other health care providers outside of the [address] [place] [address] since your last visit? Include any pap smears or colon screening.  No

## 2019-05-02 NOTE — PROGRESS NOTES
HPI:  
 
Chief Complaint Patient presents with [facility]  Complete Physical  
  fasting Henrik Mancia is a 53 y.o. male with significant history of hypertension, sleep apnea who presents as new patient for annual exam.  Patient feels well today and has no concerns or complaints. Patient relocated to [place] about 3 years ago from [place].   
 
HTN - well controlled through diet and exercise. Reports he was prescribed a medication several years ago in [place], took this for a week, then stopped. He has lost about 30 lbs over the past year and has noticed improvement in BP. Has been checking periodically at home, has been in 120-125/70s range. He feels well and denies chest pain, palpitations, dyspnea, peripheral edema, dizziness, headache, blurred vision. He is regularly exercising in the gym and working on low fat, low salt diet. Sleep apnea - diagnosed several years ago, never used CPAP. Reports he is sleeping well. Denies daytime sleepiness. Patient would like to have testosterone level checked today, has never had this checked in the past.  Denies low libido, gynecomastia, small testes. Feels he has some reduced muscle strength. Patient was hospitalized in December with pneumonia. He did not have repeat CXR. He feels very well today, no dyspnea, cough, difficulty breathing, chest pain, fevers. Health Maintenance:  
TDaP: unsure; declines vaccination today Colonoscopy: has never had; denies rectal bleeding, bloody or dark stools; denies known family hx of colorectal cancer; start screening age 48 Healthy Habits: 
Patient is exercising regularly at the gym. Has lost 30 lbs over the past year. Endorses mostly healthy diet, avoids fried, fatty foods, sugar sweetened beverages. Lots of fruits and vegetables. Patient Active Problem List  
Diagnosis Code  History of essential hypertension Z86.79  
 BMI 33.0-33.9,adult Z68.33 Current Outpatient Medications Medication Sig Dispense Refill  creatine monohydrate (CREATINE PO) Take 5 g by mouth. Indications: powder  protein supplement (PROTEIN PO) Take  by mouth. Indications: powder  OTHER Indications: prework out powder  ibuprofen (ADVIL) 200 mg tablet Take 600 mg by mouth as needed for Pain. No Known Allergies Past Medical History:  
Diagnosis Date  Hypertension  Pneumonia 12/2018  Sleep apnea History reviewed. No pertinent surgical history. Family History Problem Relation Age of Onset  Heart Attack Mother   
     in her 46s  High Cholesterol Mother  Hypertension Mother  Heart Disease Mother   
     hx bypass  Diabetes Mother  Cancer Father   
     lymphoma  No Known Problems Sister  No Known Problems Brother  Cancer Maternal Grandmother   
     unknown cancer  No Known Problems Maternal Grandfather  Cancer Paternal Grandmother   
     unknown cancer  Cancer Paternal Grandfather   
     unknown cancer  Cancer Maternal Uncle   
     unknown cancer  Prostate Cancer Neg Hx  Colon Cancer Neg Hx Social History Tobacco Use  Smoking status: Never Smoker  Smokeless tobacco: Never Used Substance Use Topics  Alcohol use: No  
  
 
 
ROS:  
 
ROS:  Feeling well. No dyspnea, palpitations, or chest pain on exertion. No abdominal pain, change in appetite, nausea, vomiting, change in bowel habits, black or bloody stools. No urinary tract or prostatic symptoms. No neurological complaints. No fever or chills. Otherwise, as documented in HPI. Physical Exam:  
 
Vitals:  
 05/02/19 7873 BP: 131/87 Pulse: 62 Resp: 16 Temp: 98.1 °F (36.7 °C) TempSrc: Oral  
SpO2: 99% Weight: 208 lb 3.2 oz (94.4 kg) Height: 5' 6\" (1.676 m) Nursing Documentation and Vital Signs Reviewed. Physical Examination:  
General appearance - alert, well appearing, and in no distress Mental status - alert, oriented to person, place, and time, normal mood, behavior, speech, dress, motor activity, and thought processes Eyes - pupils equal and reactive, extraocular eye movements intact Ears - bilateral TM's and external ear canals normal 
Nose - normal and patent, no erythema, discharge or polyps Mouth - mucous membranes moist, pharynx normal without lesions Neck - supple, no significant adenopathy, carotids upstroke normal bilaterally, no bruits, thyroid is normal in size without nodules or tenderness Chest - clear to auscultation, no wheezes, rales or rhonchi, symmetric air entry Heart - normal rate, regular rhythm, normal S1, S2, no murmurs, rubs, clicks or gallops Abdomen - soft, nontender, nondistended, no masses or organomegaly Neurological - alert, oriented, normal speech, no focal findings or movement disorder noted, DTR's normal and symmetric, normal muscle tone, no tremors, strength 5/5 Musculoskeletal - no joint tenderness, deformity or swelling Extremities - peripheral pulses normal, no pedal edema, no clubbing or cyanosis Assessment/Plan:  
Healthy adult male. Full age appropriate History and Physical exam as well as health care maintenance  performed and discussed today. Risk factor modification discussed today includes safe sex practices, healthy diet and exercise, and seat belt use. Continue current medications. Diagnoses and all orders for this visit: 1. Encounter for well adult exam without abnormal findings -     Overall healthy appearing 53year old male with normal exam, no concerns or complaints. Discussed that testosterone may not be covered by insurance, may get bill, patient is okay with this, would like level checked. -     CBC WITH AUTOMATED DIFF 
-     METABOLIC PANEL, COMPREHENSIVE 
-     TSH 3RD GENERATION 
-     LIPID PANEL 
-     HEMOGLOBIN A1C WITH EAG 
-     TESTOSTERONE, TOTAL, ADULT MALE 
-     PSA W/ REFLX FREE PSA 2. History of essential hypertension -     Was on medication for short period of time several years ago. Has since lost 30 lbs. BP stable today. Will continue to monitor. He should continue to monitor at home and follow-up if persistently >140/90s. 3. BMI 33.0-33.9,adult -     BMI discussed with patient. Congratulated on 30 lb weight loss. Continue to work on diet and exercise. Advised daily physical activity, at least 150 minutes of exercise weekly. Discussed healthy diet choices and limiting fried, fatty foods, fast foods, processed foods, sugar-sweetened beverages/soda, and added sugars. Increase fruits, vegetables, low-fat dairy products, lean proteins, and whole grains.  
-     TSH 3RD GENERATION 
-     LIPID PANEL 
-     HEMOGLOBIN A1C WITH EAG 
-     TESTOSTERONE, TOTAL, ADULT MALE Follow-up and Dispositions · Return in about 1 year (around 5/2/2020) for annual exam, sooner as needed. Discussed expected course/resolution/complications of diagnosis in detail with patient. Medication risks/benefits/costs/interactions/alternatives discussed with patient. Pt was given after visit summary which includes diagnoses, current medications & vitals. Pt expressed understanding with the diagnosis and plan

## 2019-05-02 NOTE — PATIENT INSTRUCTIONS

## 2019-05-03 ENCOUNTER — TELEPHONE (OUTPATIENT)
Dept: PRIMARY CARE CLINIC | Age: 49
End: 2019-05-03

## 2019-05-03 DIAGNOSIS — R79.89 LOW TESTOSTERONE IN MALE: Primary | ICD-10-CM

## 2019-05-03 LAB
ALBUMIN SERPL-MCNC: 4.5 G/DL (ref 3.5–5.5)
ALBUMIN/GLOB SERPL: 1.9 {RATIO} (ref 1.2–2.2)
ALP SERPL-CCNC: 106 IU/L (ref 39–117)
ALT SERPL-CCNC: 34 IU/L (ref 0–44)
AST SERPL-CCNC: 43 IU/L (ref 0–40)
BASOPHILS # BLD AUTO: 0 X10E3/UL (ref 0–0.2)
BASOPHILS NFR BLD AUTO: 0 %
BILIRUB SERPL-MCNC: 0.6 MG/DL (ref 0–1.2)
BUN SERPL-MCNC: 19 MG/DL (ref 6–24)
BUN/CREAT SERPL: 18 (ref 9–20)
CALCIUM SERPL-MCNC: 9.4 MG/DL (ref 8.7–10.2)
CHLORIDE SERPL-SCNC: 105 MMOL/L (ref 96–106)
CHOLEST SERPL-MCNC: 172 MG/DL (ref 100–199)
CO2 SERPL-SCNC: 23 MMOL/L (ref 20–29)
CREAT SERPL-MCNC: 1.08 MG/DL (ref 0.76–1.27)
EOSINOPHIL # BLD AUTO: 0.1 X10E3/UL (ref 0–0.4)
EOSINOPHIL NFR BLD AUTO: 2 %
ERYTHROCYTE [DISTWIDTH] IN BLOOD BY AUTOMATED COUNT: 13.8 % (ref 12.3–15.4)
EST. AVERAGE GLUCOSE BLD GHB EST-MCNC: 120 MG/DL
GLOBULIN SER CALC-MCNC: 2.4 G/DL (ref 1.5–4.5)
GLUCOSE SERPL-MCNC: 94 MG/DL (ref 65–99)
HBA1C MFR BLD: 5.8 % (ref 4.8–5.6)
HCT VFR BLD AUTO: 43.1 % (ref 37.5–51)
HDLC SERPL-MCNC: 78 MG/DL
HGB BLD-MCNC: 14.8 G/DL (ref 13–17.7)
IMM GRANULOCYTES # BLD AUTO: 0 X10E3/UL (ref 0–0.1)
IMM GRANULOCYTES NFR BLD AUTO: 0 %
LDLC SERPL CALC-MCNC: 83 MG/DL (ref 0–99)
LYMPHOCYTES # BLD AUTO: 1.8 X10E3/UL (ref 0.7–3.1)
LYMPHOCYTES NFR BLD AUTO: 36 %
MCH RBC QN AUTO: 29.7 PG (ref 26.6–33)
MCHC RBC AUTO-ENTMCNC: 34.3 G/DL (ref 31.5–35.7)
MCV RBC AUTO: 86 FL (ref 79–97)
MONOCYTES # BLD AUTO: 0.6 X10E3/UL (ref 0.1–0.9)
MONOCYTES NFR BLD AUTO: 12 %
NEUTROPHILS # BLD AUTO: 2.5 X10E3/UL (ref 1.4–7)
NEUTROPHILS NFR BLD AUTO: 50 %
PLATELET # BLD AUTO: 218 X10E3/UL (ref 150–379)
POTASSIUM SERPL-SCNC: 4.5 MMOL/L (ref 3.5–5.2)
PROT SERPL-MCNC: 6.9 G/DL (ref 6–8.5)
PSA SERPL-MCNC: 0.5 NG/ML (ref 0–4)
RBC # BLD AUTO: 4.99 X10E6/UL (ref 4.14–5.8)
REFLEX CRITERIA: NORMAL
SODIUM SERPL-SCNC: 142 MMOL/L (ref 134–144)
TESTOST SERPL-MCNC: 259 NG/DL (ref 264–916)
TRIGL SERPL-MCNC: 54 MG/DL (ref 0–149)
TSH SERPL DL<=0.005 MIU/L-ACNC: 1.75 UIU/ML (ref 0.45–4.5)
VLDLC SERPL CALC-MCNC: 11 MG/DL (ref 5–40)
WBC # BLD AUTO: 4.9 X10E3/UL (ref 3.4–10.8)

## 2019-05-03 NOTE — PROGRESS NOTES
Please call patient: 
 
Testosterone level is on the lower side. He can let me know if he would like referral to urologist for further evaluation and management. Hgb A1c (average blood sugar) is slightly elevated in the range of prediabetes. Prediabetes means that your blood sugar is higher than it should be and is a warning sign that you are at risk for getting type 2 diabetes. Lifestyle changes can help get your blood sugar back within the normal range and help you avoid diabetes. Watch your weight, limit calories, sweets, and unhealthy fats. Try to get at least 30 minutes of exercise on most days of the week, 150 minutes/week is recommended. Otherwise, CBC, kidney, thyroid, and prostate enzyme are normal.  One liver enzyme is very mildly elevated, not in concerning range. Avoid alcohol and tylenol products. Cholesterol levels are in normal range, keep up the good work! Continue to work on diet and exercise.

## 2019-05-03 NOTE — TELEPHONE ENCOUNTER
----- Message from Alexandra Hurd NP sent at 4/1/3384  7:29 AM EDT -----  Please call patient:    Testosterone level is on the lower side. He can let me know if he would like referral to urologist for further evaluation and management. Hgb A1c (average blood sugar) is slightly elevated in the range of prediabetes. Prediabetes means that your blood sugar is higher than it should be and is a warning sign that you are at risk for getting type 2 diabetes. Lifestyle changes can help get your blood sugar back within the normal range and help you avoid diabetes. Watch your weight, limit calories, sweets, and unhealthy fats. Try to get at least 30 minutes of exercise on most days of the week, 150 minutes/week is recommended. Otherwise, CBC, kidney, thyroid, and prostate enzyme are normal.  One liver enzyme is very mildly elevated, not in concerning range. Avoid alcohol and tylenol products. Cholesterol levels are in normal range, keep up the good work! Continue to work on diet and exercise.

## 2019-05-03 NOTE — TELEPHONE ENCOUNTER
Discussed results and recommendations with patient. He verbalized his understanding and said he does want to see the urologist so I have pend the referral over to you, Ruben Cantu.

## 2020-11-24 ENCOUNTER — OFFICE VISIT (OUTPATIENT)
Dept: PRIMARY CARE CLINIC | Age: 50
End: 2020-11-24
Payer: MEDICAID

## 2020-11-24 VITALS
OXYGEN SATURATION: 96 % | TEMPERATURE: 98.6 F | HEIGHT: 66 IN | RESPIRATION RATE: 16 BRPM | HEART RATE: 68 BPM | BODY MASS INDEX: 37.83 KG/M2 | DIASTOLIC BLOOD PRESSURE: 87 MMHG | WEIGHT: 235.4 LBS | SYSTOLIC BLOOD PRESSURE: 138 MMHG

## 2020-11-24 DIAGNOSIS — Z12.11 COLON CANCER SCREENING: ICD-10-CM

## 2020-11-24 DIAGNOSIS — R79.89 LOW TESTOSTERONE IN MALE: ICD-10-CM

## 2020-11-24 DIAGNOSIS — R73.03 PREDIABETES: ICD-10-CM

## 2020-11-24 DIAGNOSIS — Z12.5 SCREENING PSA (PROSTATE SPECIFIC ANTIGEN): ICD-10-CM

## 2020-11-24 DIAGNOSIS — I10 ESSENTIAL HYPERTENSION: ICD-10-CM

## 2020-11-24 DIAGNOSIS — R73.03 PREDIABETES: Primary | ICD-10-CM

## 2020-11-24 DIAGNOSIS — E55.9 VITAMIN D DEFICIENCY: ICD-10-CM

## 2020-11-24 PROBLEM — E66.01 SEVERE OBESITY (HCC): Status: ACTIVE | Noted: 2020-11-24

## 2020-11-24 PROCEDURE — 99214 OFFICE O/P EST MOD 30 MIN: CPT | Performed by: NURSE PRACTITIONER

## 2020-11-24 NOTE — PROGRESS NOTES
This note will not be viewable in [address] Jah Razo is a 53y.o. year old male who is a new patient to me today. [facility]    Jah Razo is a  53 y.o. male presents for visit. Chief Complaint   Patient presents with    Follow-up     patient following up on cholesterol, diabetes, and eyesight. He said T is low. HPI    Presents to follow up on chronic care. He is fasting for labs. Generally feeling well. Active with weight lifting and cardio. Managing high blood pressure through lifestyle. BP readings at home are up and down. Follows a low salt diet. Reports needing to use \"readers\" now which is new. Last labs showed low T. His former pcp in [place] checked his bone density and he was found to have \"weak\" bones. Started supplemental vitamin D at that time 3 years ago and takes daily. Review of Systems   Eyes: Positive for blurred vision. All other systems reviewed and are negative. Past Medical History:   Diagnosis Date    Hypertension     Pneumonia 12/2018    Sleep apnea       History reviewed. No pertinent surgical history.      Social History     Tobacco Use    Smoking status: Never Smoker    Smokeless tobacco: Never Used   Substance Use Topics    Alcohol use: No      Social History     Social History Narrative    Not on file     Family History   Problem Relation Age of Onset    Heart Attack Mother         in her 46s    High Cholesterol Mother     Hypertension Mother     Heart Disease Mother         hx bypass    Diabetes Mother     Cancer Father         lymphoma    No Known Problems Sister     No Known Problems Brother     Cancer Maternal Grandmother         unknown cancer    No Known Problems Maternal Grandfather     Cancer Paternal Grandmother         unknown cancer    Cancer Paternal Grandfather         unknown cancer    Cancer Maternal Uncle         unknown cancer    Prostate Cancer Neg Hx     Colon Cancer Neg Hx Prior to Admission medications    Medication Sig Start Date End Date Taking? Authorizing Provider   creatine monohydrate (CREATINE PO) Take 5 g by mouth. Indications: powder   Yes Provider, Historical   protein supplement (PROTEIN PO) Take  by mouth. Indications: powder   Yes Provider, Historical   OTHER Indications: prework out powder    Provider, Historical   ibuprofen (ADVIL) 200 mg tablet Take 600 mg by mouth as needed for Pain. Provider, Historical      No Known Allergies       Visit Vitals  /87   Pulse 68   Temp 98.6 °F (37 °C) (Temporal)   Resp 16   Ht 5' 6\" (1.676 m)   Wt 235 lb 6.4 oz (106.8 kg)   SpO2 96%   BMI 37.99 kg/m²     Physical Exam  Vitals signs and nursing note reviewed. Constitutional:       General: He is not in acute distress. Appearance: Normal appearance. He is well-developed. Comments: Muscular build-   HENT:      Head: Normocephalic and atraumatic. Right Ear: External ear normal.      Left Ear: External ear normal.      Nose: Nose normal.   Eyes:      Conjunctiva/sclera: Conjunctivae normal.   Neck:      Musculoskeletal: Neck supple. Cardiovascular:      Rate and Rhythm: Normal rate and regular rhythm. Heart sounds: Normal heart sounds. No murmur. Pulmonary:      Effort: Pulmonary effort is normal.      Breath sounds: Normal breath sounds. No wheezing. Chest:      Chest wall: No tenderness. Abdominal:      General: Bowel sounds are normal. There is no distension. Palpations: Abdomen is soft. Tenderness: There is no abdominal tenderness. Musculoskeletal:      Right lower leg: No edema. Left lower leg: No edema. Skin:     General: Skin is warm and dry. Neurological:      Mental Status: He is alert and oriented to person, place, and time. Gait: Gait normal.   Psychiatric:         Mood and Affect: Mood normal.         Speech: Speech normal.         Behavior: Behavior normal.         Thought Content:  Thought content normal.       No visits with results within 1 Year(s) from this visit. Latest known visit with results is:   Office Visit on 05/02/2019   Component Date Value Ref Range Status    WBC 05/02/2019 4.9  3.4 - 10.8 x10E3/uL Final    RBC 05/02/2019 4.99  4.14 - 5.80 x10E6/uL Final    HGB 05/02/2019 14.8  13.0 - 17.7 g/dL Final    HCT 05/02/2019 43.1  37.5 - 51.0 % Final    MCV 05/02/2019 86  79 - 97 fL Final    MCH 05/02/2019 29.7  26.6 - 33.0 pg Final    MCHC 05/02/2019 34.3  31.5 - 35.7 g/dL Final    RDW 05/02/2019 13.8  12.3 - 15.4 % Final    PLATELET 43/07/8256 950  150 - 379 x10E3/uL Final    NEUTROPHILS 05/02/2019 50  Not Estab. % Final    Lymphocytes 05/02/2019 36  Not Estab. % Final    MONOCYTES 05/02/2019 12  Not Estab. % Final    EOSINOPHILS 05/02/2019 2  Not Estab. % Final    BASOPHILS 05/02/2019 0  Not Estab. % Final    ABS. NEUTROPHILS 05/02/2019 2.5  1.4 - 7.0 x10E3/uL Final    Abs Lymphocytes 05/02/2019 1.8  0.7 - 3.1 x10E3/uL Final    ABS. MONOCYTES 05/02/2019 0.6  0.1 - 0.9 x10E3/uL Final    ABS. EOSINOPHILS 05/02/2019 0.1  0.0 - 0.4 x10E3/uL Final    ABS. BASOPHILS 05/02/2019 0.0  0.0 - 0.2 x10E3/uL Final    IMMATURE GRANULOCYTES 05/02/2019 0  Not Estab. % Final    ABS. IMM.  GRANS. 05/02/2019 0.0  0.0 - 0.1 x10E3/uL Final    Glucose 05/02/2019 94  65 - 99 mg/dL Final    BUN 05/02/2019 19  6 - 24 mg/dL Final    Creatinine 05/02/2019 1.08  0.76 - 1.27 mg/dL Final    GFR est non-AA 05/02/2019 81  >59 mL/min/1.73 Final    GFR est AA 05/02/2019 94  >59 mL/min/1.73 Final    BUN/Creatinine ratio 05/02/2019 18  9 - 20 Final    Sodium 05/02/2019 142  134 - 144 mmol/L Final    Potassium 05/02/2019 4.5  3.5 - 5.2 mmol/L Final    Chloride 05/02/2019 105  96 - 106 mmol/L Final    CO2 05/02/2019 23  20 - 29 mmol/L Final    Calcium 05/02/2019 9.4  8.7 - 10.2 mg/dL Final    Protein, total 05/02/2019 6.9  6.0 - 8.5 g/dL Final    Albumin 05/02/2019 4.5  3.5 - 5.5 g/dL Final    GLOBULIN, TOTAL 05/02/2019 2.4  1.5 - 4.5 g/dL Final    A-G Ratio 05/02/2019 1.9  1.2 - 2.2 Final    Bilirubin, total 05/02/2019 0.6  0.0 - 1.2 mg/dL Final    Alk. phosphatase 05/02/2019 106  39 - 117 IU/L Final    AST (SGOT) 05/02/2019 43* 0 - 40 IU/L Final    ALT (SGPT) 05/02/2019 34  0 - 44 IU/L Final    TSH 05/02/2019 1.750  0.450 - 4.500 uIU/mL Final    Cholesterol, total 05/02/2019 172  100 - 199 mg/dL Final    Triglyceride 05/02/2019 54  0 - 149 mg/dL Final    HDL Cholesterol 05/02/2019 78  >39 mg/dL Final    VLDL, calculated 05/02/2019 11  5 - 40 mg/dL Final    LDL, calculated 05/02/2019 83  0 - 99 mg/dL Final    Hemoglobin A1c 05/02/2019 5.8* 4.8 - 5.6 % Final    Comment:          Prediabetes: 5.7 - 6.4           Diabetes: >6.4           Glycemic control for adults with diabetes: <7.0      Estimated average glucose 05/02/2019 120  mg/dL Final    Testosterone 05/02/2019 259* 264 - 916 ng/dL Final    Comment: Adult male reference interval is based on a population of  healthy nonobese males (BMI <30) between 23and 44years old. [address] [place] [address], [address] [place] [address] [phone]. PMID: [id number].  Prostate Specific Ag 05/02/2019 0.5  0.0 - 4.0 ng/mL Final    Comment: Roche ECLIA methodology. According to the American U[employer] Association, Serum PSA should  decrease and remain at undetectable levels after radical  prostatectomy. The AUA defines biochemical recurrence as an initial  PSA value 0.2 ng/mL or greater followed by a subsequent confirmatory  PSA value 0.2 ng/mL or greater. Values obtained with different assay methods or kits cannot be used  interchangeably. Results cannot be interpreted as absolute evidence  of the presence or absence of malignant disease.  Reflex Criteria 05/02/2019 Comment   Final    Comment: The percent free PSA is performed on a reflex basis only when the  total PSA is between 4.0 and 10.0 ng/mL.               No results found for this or any previous visit (from the past 24 hour(s)). ASSESSMENT AND PLAN:  Patient Active Problem List    Diagnosis Date Noted    Severe obesity ([facility] [address] 11/24/2020    History of essential hypertension 05/02/2019    BMI 33.0-33.9,adult 05/02/2019       ICD-10-CM ICD-9-CM   1. Prediabetes  R73.03 790.29   2. Low testosterone in male  R79.89 790.99   3. Screening PSA (prostate specific antigen)  Z12.5 V76.44   4. Essential hypertension  I10 401.9   5. BMI 37.0-37.9, adult  Z68.37 V85.37   6. Colon cancer screening  Z12.11 V76.51   7. Vitamin D deficiency  E55.9 268.9     Orders Placed This Encounter    HEMOGLOBIN A1C WITH EAG     Standing Status:   Future     Standing Expiration Date:   11/24/2021    TESTOSTERONE, FREE & TOTAL     Standing Status:   Future     Standing Expiration Date:   11/24/2021    PSA SCREENING (SCREENING)     Standing Status:   Future     Standing Expiration Date:   11/24/2021    CBC W/O DIFF     Standing Status:   Future     Standing Expiration Date:   11/24/2021    TSH 3RD GENERATION     Standing Status:   Future     Standing Expiration Date:   11/24/2021    LIPID PANEL     Standing Status:   Future     Standing Expiration Date:   11/24/2021    VITAMIN D, 25 HYDROXY     Standing Status:   Future     Standing Expiration Date:   67/86/5095    METABOLIC PANEL, COMPREHENSIVE     Standing Status:   Future     Standing Expiration Date:   11/24/2021    REFERRAL TO GASTROENTEROLOGY     Referral Priority:   Routine     Referral Type:   Consultation     Referral Reason:   Specialty Services Required     Referred to Provider:   Sam Gao MD     Requested Specialty:   Gastroenterology     Number of Visits Requested:   1       Diagnoses and all orders for this visit:    1. Prediabetes  -     HEMOGLOBIN A1C WITH EAG; Future  -     TSH 3RD GENERATION; Future  -     LIPID PANEL; Future    2. Low testosterone in male  -     TESTOSTERONE, FREE & TOTAL; Future  -     LIPID PANEL; Future    3.  Screening PSA (prostate specific antigen)  -     PSA SCREENING (SCREENING); Future    4. Essential hypertension  -     CBC W/O DIFF; Future  -     TSH 3RD GENERATION; Future  -     LIPID PANEL; Future  -     METABOLIC PANEL, COMPREHENSIVE; Future    5. BMI 37.0-37.9, adult  -     TSH 3RD GENERATION; Future    6. Colon cancer screening  -     REFERRAL TO GASTROENTEROLOGY    7. Vitamin D deficiency  -     VITAMIN D, 25 HYDROXY; Future        lab results and schedule of future lab studies reviewed with patient  reviewed diet, exercise and weight control                  Disclaimer:  Advised him to call back or return to office if symptoms worsen/change/persist.  Discussed expected course/resolution/complications of diagnosis in detail with patient. Medication risks/benefits/costs/interactions/alternatives discussed with patient. He was given an after visit summary which includes diagnoses, current medications, & vitals. He expressed understanding with the diagnosis and plan.

## 2020-11-24 NOTE — PROGRESS NOTES
Chief Complaint   Patient presents with    Follow-up     patient following up on cholesterol, diabetes, and eyesight. He said T is low.

## 2020-11-24 NOTE — PATIENT INSTRUCTIONS
DASH Diet: Care Instructions Your Care Instructions The DASH diet is an eating plan that can help lower your blood pressure. DASH stands for Dietary Approaches to Stop Hypertension. Hypertension is high blood pressure. The DASH diet focuses on eating foods that are high in calcium, potassium, and magnesium. These nutrients can lower blood pressure. The foods that are highest in these nutrients are fruits, vegetables, low-fat dairy products, nuts, seeds, and legumes. But taking calcium, potassium, and magnesium supplements instead of eating foods that are high in those nutrients does not have the same effect. The DASH diet also includes whole grains, fish, and poultry. The DASH diet is one of several lifestyle changes your doctor may recommend to lower your high blood pressure. Your doctor may also want you to decrease the amount of sodium in your diet. Lowering sodium while following the DASH diet can lower blood pressure even further than just the DASH diet alone. Follow-up care is a key part of your treatment and safety. Be sure to make and go to all appointments, and call your doctor if you are having problems. It's also a good idea to know your test results and keep a list of the medicines you take. How can you care for yourself at home? Following the DASH diet · Eat 4 to 5 servings of fruit each day. A serving is 1 medium-sized piece of fruit, ½ cup chopped or canned fruit, 1/4 cup dried fruit, or 4 ounces (½ cup) of fruit juice. Choose fruit more often than fruit juice. · Eat 4 to 5 servings of vegetables each day. A serving is 1 cup of lettuce or raw leafy vegetables, ½ cup of chopped or cooked vegetables, or 4 ounces (½ cup) of vegetable juice. Choose vegetables more often than vegetable juice. · Get 2 to 3 servings of low-fat and fat-free dairy each day. A serving is 8 ounces of milk, 1 cup of yogurt, or 1 ½ ounces of cheese. · Eat 6 to 8 servings of grains each day. A serving is 1 slice of bread, 1 ounce of dry cereal, or ½ cup of cooked rice, pasta, or cooked cereal. Try to choose whole-grain products as much as possible. · Limit lean meat, poultry, and fish to 2 servings each day. A serving is 3 ounces, about the size of a deck of cards. · Eat 4 to 5 servings of nuts, seeds, and legumes (cooked dried beans, lentils, and split peas) each week. A serving is 1/3 cup of nuts, 2 tablespoons of seeds, or ½ cup of cooked beans or peas. · Limit fats and oils to 2 to 3 servings each day. A serving is 1 teaspoon of vegetable oil or 2 tablespoons of salad dressing. · Limit sweets and added sugars to 5 servings or less a week. A serving is 1 tablespoon jelly or jam, ½ cup sorbet, or 1 cup of lemonade. · Eat less than 2,300 milligrams (mg) of sodium a day. If you limit your sodium to 1,500 mg a day, you can lower your blood pressure even more. Tips for success · Start small. Do not try to make dramatic changes to your diet all at once. You might feel that you are missing out on your favorite foods and then be more likely to not follow the plan. Make small changes, and stick with them. Once those changes become habit, add a few more changes. · Try some of the following: ? Make it a goal to eat a fruit or vegetable at every meal and at snacks. This will make it easy to get the recommended amount of fruits and vegetables each day. ? Try yogurt topped with fruit and nuts for a snack or healthy dessert. ? Add lettuce, tomato, cucumber, and onion to sandwiches. ? Combine a ready-made pizza crust with low-fat mozzarella cheese and lots of vegetable toppings. Try using tomatoes, squash, spinach, broccoli, carrots, cauliflower, and onions. ? Have a variety of cut-up vegetables with a low-fat dip as an appetizer instead of chips and dip. ? Sprinkle sunflower seeds or chopped almonds over salads.  Or try adding chopped walnuts or almonds to cooked vegetables. ? Try some vegetarian meals using beans and peas. Add garbanzo or kidney beans to salads. Make burritos and tacos with mashed gardner beans or black beans. Where can you learn more? Go to http://www.gray.com/ Enter D453 in the search box to learn more about \"DASH Diet: Care Instructions. \" Current as of: December 16, 2019               Content Version: 12.6 © 6758-5925 [employer]. Care instructions adapted under license by [employer] (which disclaims liability or warranty for this information). If you have questions about a medical condition or this instruction, always ask your healthcare professional. [address] any warranty or liability for your use of this information. High Blood Pressure: Care Instructions Overview It's normal for blood pressure to go up and down throughout the day. But if it stays up, you have high blood pressure. Another name for high blood pressure is hypertension. Despite what a lot of people think, high blood pressure usually doesn't cause headaches or make you feel dizzy or lightheaded. It usually has no symptoms. But it does increase your risk of stroke, heart attack, and other problems. You and your doctor will talk about your risks of these problems based on your blood pressure. Your doctor will give you a goal for your blood pressure. Your goal will be based on your health and your age. Lifestyle changes, such as eating healthy and being active, are always important to help lower blood pressure. You might also take medicine to reach your blood pressure goal. 
Follow-up care is a key part of your treatment and safety. Be sure to make and go to all appointments, and call your doctor if you are having problems. It's also a good idea to know your test results and keep a list of the medicines you take. How can you care for yourself at home? Medical treatment · If you stop taking your medicine, your blood pressure will go back up. You may take one or more types of medicine to lower your blood pressure. Be safe with medicines. Take your medicine exactly as prescribed. Call your doctor if you think you are having a problem with your medicine. · Talk to your doctor before you start taking aspirin every day. Aspirin can help certain people lower their risk of a heart attack or stroke. But taking aspirin isn't right for everyone, because it can cause serious bleeding. · See your doctor regularly. You may need to see the doctor more often at first or until your blood pressure comes down. · If you are taking blood pressure medicine, talk to your doctor before you take decongestants or anti-inflammatory medicine, such as ibuprofen. Some of these medicines can raise blood pressure. · Learn how to check your blood pressure at home. Lifestyle changes · Stay at a healthy weight. This is especially important if you put on weight around the waist. Losing even 10 pounds can help you lower your blood pressure. · If your doctor recommends it, get more exercise. Walking is a good choice. Bit by bit, increase the amount you walk every day. Try for at least 30 minutes on most days of the week. You also may want to swim, bike, or do other activities. · Avoid or limit alcohol. Talk to your doctor about whether you can drink any alcohol. · Try to limit how much sodium you eat to less than 2,300 milligrams (mg) a day. Your doctor may ask you to try to eat less than 1,500 mg a day. · Eat plenty of fruits (such as bananas and oranges), vegetables, legumes, whole grains, and low-fat dairy products. · Lower the amount of saturated fat in your diet. Saturated fat is found in animal products such as milk, cheese, and meat. Limiting these foods may help you lose weight and also lower your risk for heart disease. · Do not smoke. Smoking increases your risk for heart attack and stroke. If you need help quitting, talk to your doctor about stop-smoking programs and medicines. These can increase your chances of quitting for good. When should you call for help? Call  911 anytime you think you may need emergency care. This may mean having symptoms that suggest that your blood pressure is causing a serious heart or blood vessel problem. Your blood pressure may be over 180/120. For example, call 911 if: 
  · You have symptoms of a heart attack. These may include: 
? Chest pain or pressure, or a strange feeling in the chest. 
? Sweating. ? Shortness of breath. ? Nausea or vomiting. ? Pain, pressure, or a strange feeling in the back, neck, jaw, or upper belly or in one or both shoulders or arms. ? Lightheadedness or sudden weakness. ? A fast or irregular heartbeat.  
  · You have symptoms of a stroke. These may include: 
? Sudden numbness, tingling, weakness, or loss of movement in your face, arm, or leg, especially on only one side of your body. ? Sudden vision changes. ? Sudden trouble speaking. ? Sudden confusion or trouble understanding simple statements. ? Sudden problems with walking or balance. ? A sudden, severe headache that is different from past headaches.  
  · You have severe back or belly pain. Do not wait until your blood pressure comes down on its own. Get help right away. Call your doctor now or seek immediate care if: 
  · Your blood pressure is much higher than normal (such as 180/120 or higher), but you don't have symptoms.  
  · You think high blood pressure is causing symptoms, such as: 
? Severe headache. 
? Blurry vision. Watch closely for changes in your health, and be sure to contact your doctor if: 
  · Your blood pressure measures higher than your doctor recommends at least 2 times. That means the top number is higher or the bottom number is higher, or both.   · You think you may be having side effects from your blood pressure medicine. Where can you learn more? Go to http://www.gray.com/ Enter C047 in the search box to learn more about \"High Blood Pressure: Care Instructions. \" Current as of: December 16, 2019               Content Version: 12.6 © 6666-6513 "[employer]. Care instructions adapted under license by [employer] (which disclaims liability or warranty for this information). If you have questions about a medical condition or this instruction, always ask your healthcare professional. [address] any warranty or liability for your use of this information.

## 2020-11-25 LAB
25(OH)D3 SERPL-MCNC: 26.1 NG/ML (ref 30–100)
ALBUMIN SERPL-MCNC: 4 G/DL (ref 3.5–5)
ALBUMIN/GLOB SERPL: 1.2 {RATIO} (ref 1.1–2.2)
ALP SERPL-CCNC: 127 U/L (ref 45–117)
ALT SERPL-CCNC: 41 U/L (ref 12–78)
ANION GAP SERPL CALC-SCNC: 4 MMOL/L (ref 5–15)
AST SERPL-CCNC: 40 U/L (ref 15–37)
BILIRUB SERPL-MCNC: 0.6 MG/DL (ref 0.2–1)
BUN SERPL-MCNC: 17 MG/DL (ref 6–20)
BUN/CREAT SERPL: 15 (ref 12–20)
CALCIUM SERPL-MCNC: 9.1 MG/DL (ref 8.5–10.1)
CHLORIDE SERPL-SCNC: 107 MMOL/L (ref 97–108)
CHOLEST SERPL-MCNC: 179 MG/DL
CO2 SERPL-SCNC: 28 MMOL/L (ref 21–32)
CREAT SERPL-MCNC: 1.16 MG/DL (ref 0.7–1.3)
ERYTHROCYTE [DISTWIDTH] IN BLOOD BY AUTOMATED COUNT: 12 % (ref 11.5–14.5)
EST. AVERAGE GLUCOSE BLD GHB EST-MCNC: 114 MG/DL
GLOBULIN SER CALC-MCNC: 3.3 G/DL (ref 2–4)
GLUCOSE SERPL-MCNC: 97 MG/DL (ref 65–100)
HBA1C MFR BLD: 5.6 % (ref 4–5.6)
HCT VFR BLD AUTO: 45 % (ref 36.6–50.3)
HDLC SERPL-MCNC: 81 MG/DL
HDLC SERPL: 2.2 {RATIO} (ref 0–5)
HGB BLD-MCNC: 15.2 G/DL (ref 12.1–17)
LDLC SERPL CALC-MCNC: 87.2 MG/DL (ref 0–100)
LIPID PROFILE,FLP: NORMAL
MCH RBC QN AUTO: 29.9 PG (ref 26–34)
MCHC RBC AUTO-ENTMCNC: 33.8 G/DL (ref 30–36.5)
MCV RBC AUTO: 88.6 FL (ref 80–99)
NRBC # BLD: 0 K/UL (ref 0–0.01)
NRBC BLD-RTO: 0 PER 100 WBC
PLATELET # BLD AUTO: 226 K/UL (ref 150–400)
PMV BLD AUTO: 11 FL (ref 8.9–12.9)
POTASSIUM SERPL-SCNC: 4.4 MMOL/L (ref 3.5–5.1)
PROT SERPL-MCNC: 7.3 G/DL (ref 6.4–8.2)
PSA SERPL-MCNC: 0.5 NG/ML (ref 0.01–4)
RBC # BLD AUTO: 5.08 M/UL (ref 4.1–5.7)
SODIUM SERPL-SCNC: 139 MMOL/L (ref 136–145)
TESTOST FREE SERPL-MCNC: 11.7 PG/ML (ref 6.8–21.5)
TESTOST SERPL-MCNC: 240 NG/DL (ref 264–916)
TRIGL SERPL-MCNC: 54 MG/DL (ref ?–150)
TSH SERPL DL<=0.05 MIU/L-ACNC: 1.94 UIU/ML (ref 0.36–3.74)
VLDLC SERPL CALC-MCNC: 10.8 MG/DL
WBC # BLD AUTO: 4.8 K/UL (ref 4.1–11.1)

## 2020-11-27 DIAGNOSIS — R79.89 LOW SERUM TESTOSTERONE LEVEL IN MALE: Primary | ICD-10-CM

## 2020-11-27 NOTE — PROGRESS NOTES
Please call patient and let him know his labs show  testosterone low and vitamin D a little low. Referring him to [facility] urology for testosterone and ask him to bump up his vitamin D intake by 1000 iu daily. Other labs look good.   Thanks

## 2020-11-30 ENCOUNTER — TELEPHONE (OUTPATIENT)
Dept: SURGERY | Age: 50
End: 2020-11-30

## 2020-11-30 NOTE — TELEPHONE ENCOUNTER
Called and left message on self identified voicemail. Testosterone is low, provider is referring to [facility], and also wants him to up his daily Vitamin D intake to 1000 iu. Advised on message that I am mailing referral to him.

## 2022-03-18 PROBLEM — E66.01 SEVERE OBESITY (HCC): Status: ACTIVE | Noted: 2020-11-24

## 2022-03-20 PROBLEM — Z86.79 HISTORY OF ESSENTIAL HYPERTENSION: Status: ACTIVE | Noted: 2019-05-02

## 2022-05-24 ENCOUNTER — OFFICE VISIT (OUTPATIENT)
Dept: INTERNAL MEDICINE CLINIC | Age: 52
End: 2022-05-24
Payer: MEDICAID

## 2022-05-24 VITALS
TEMPERATURE: 97.2 F | HEIGHT: 65 IN | OXYGEN SATURATION: 97 % | HEART RATE: 76 BPM | SYSTOLIC BLOOD PRESSURE: 132 MMHG | WEIGHT: 237 LBS | DIASTOLIC BLOOD PRESSURE: 82 MMHG | BODY MASS INDEX: 39.49 KG/M2 | RESPIRATION RATE: 16 BRPM

## 2022-05-24 DIAGNOSIS — N52.1 ERECTILE DYSFUNCTION DUE TO DISEASES CLASSIFIED ELSEWHERE: ICD-10-CM

## 2022-05-24 DIAGNOSIS — E66.01 SEVERE OBESITY (HCC): Primary | ICD-10-CM

## 2022-05-24 DIAGNOSIS — E55.9 VITAMIN D DEFICIENCY: ICD-10-CM

## 2022-05-24 DIAGNOSIS — R79.89 LOW TESTOSTERONE IN MALE: ICD-10-CM

## 2022-05-24 DIAGNOSIS — Z12.11 SCREENING FOR MALIGNANT NEOPLASM OF COLON: ICD-10-CM

## 2022-05-24 DIAGNOSIS — Z12.5 SCREENING FOR MALIGNANT NEOPLASM OF PROSTATE: ICD-10-CM

## 2022-05-24 DIAGNOSIS — Z13.220 SCREENING CHOLESTEROL LEVEL: ICD-10-CM

## 2022-05-24 DIAGNOSIS — Z11.59 ENCOUNTER FOR HEPATITIS C SCREENING TEST FOR LOW RISK PATIENT: ICD-10-CM

## 2022-05-24 PROCEDURE — 99204 OFFICE O/P NEW MOD 45 MIN: CPT | Performed by: INTERNAL MEDICINE

## 2022-05-24 RX ORDER — SILDENAFIL 50 MG/1
50 TABLET, FILM COATED ORAL
Qty: 6 TABLET | Refills: 1 | Status: SHIPPED | OUTPATIENT
Start: 2022-05-24 | End: 2022-10-31

## 2022-05-24 NOTE — PROGRESS NOTES
1. \"Have you been to the ER, urgent care clinic since your last visit? Hospitalized since your last visit? \" No    2. \"Have you seen or consulted any other health care providers outside of the [address] [place] [address] since your last visit? \" No     3. For patients aged 39-70: Has the patient had a colonoscopy / FIT/ Cologuard? No      If the patient is female:    4. For patients aged 41-77: Has the patient had a mammogram within the past 2 years? NA - based on age or sex      11. For patients aged 21-65: Has the patient had a pap smear?  NA - based on age or sex

## 2022-05-24 NOTE — PROGRESS NOTES
Mr. Aura Sandoval is presenting to establish care    CC:  Establish Care       HPI:       48 yo male presenting to establish care  Patient has has a hx of pre diabetes and low testosterone  Not on repletion for testosterone diagnosed during  pandemic and did not follow up   Feels decreased urge to have sex, has erection but unable to maintain it   Loosing hair  No fatigue  Exercises and lifts weights daily which provides energy  Denies depression  Denies peripheral vision loss  Does note joint ache in hips and hands  No stiffness  No swelling  Does not take testosterone supplements, only protein supplements to work out    Pre hypertension: BP is mildly elevated \" Present for several years\"   Took medicine for a short period of time  Denies CP an dyspnea, has great exercise tolerance         5 kids  Works for an [employer]   Works as  cars      Review of systems:  Constitutional: negative for fever, chills, weight loss, night sweats   10 systems reviewed and negative other then HPI     Past Medical History:   Diagnosis Date    Hypertension     Pneumonia 12/2018    Sleep apnea         History reviewed. No pertinent surgical history. No Known Allergies    Current Outpatient Medications on File Prior to Visit   Medication Sig Dispense Refill    creatine monohydrate (CREATINE PO) Take 5 g by mouth. Indications: powder      protein supplement (PROTEIN PO) Take  by mouth. Indications: powder      OTHER Indications: prework out powder      ibuprofen (ADVIL) 200 mg tablet Take 600 mg by mouth as needed for Pain. No current facility-administered medications on file prior to visit.        family history includes Cancer in his father, maternal grandmother, maternal uncle, paternal grandfather, and paternal grandmother; Diabetes in his mother; Heart Attack in his mother; Heart Disease in his mother; High Cholesterol in his mother; Hypertension in his mother; No Known Problems in his brother, maternal grandfather, and sister. Social History     Socioeconomic History    Marital status:      Spouse name: Not on file    Number of children: Not on file    Years of education: Not on file    Highest education level: Not on file   Occupational History    Not on file   Tobacco Use    Smoking status: Never Smoker    Smokeless tobacco: Never Used   Vaping Use    Vaping Use: Never used   Substance and Sexual Activity    Alcohol use: No    Drug use: No    Sexual activity: Yes     Partners: Female   Other Topics Concern    Not on file   Social History Narrative    Not on file     Social Determinants of Health     Financial Resource Strain:     Difficulty of Paying Living Expenses: Not on file   Food Insecurity:     Worried About Running Out of Food in the Last Year: Not on file    Jose Alejandro of Food in the Last Year: Not on file   Transportation Needs:     Lack of Transportation (Medical): Not on file    Lack of Transportation (Non-Medical):  Not on file   Physical Activity:     Days of Exercise per Week: Not on file    Minutes of Exercise per Session: Not on file   Stress:     Feeling of Stress : Not on file   Social Connections:     Frequency of Communication with Friends and Family: Not on file    Frequency of Social Gatherings with Friends and Family: Not on file    Attends Rastafari Services: Not on file    Active Member of [address] [place] [address] [employer] or Organizations: Not on file    Attends Club or Organization Meetings: Not on file    Marital Status: Not on file   Intimate Partner Violence:     Fear of Current or Ex-Partner: Not on file    Emotionally Abused: Not on file    Physically Abused: Not on file    Sexually Abused: Not on file   Housing Stability:     Unable to Pay for Housing in the Last Year: Not on file    Number of [place] in the Last Year: Not on file    Unstable Housing in the Last Year: Not on file       Visit Vitals  /82   Pulse 76   Temp 97.2 °F (36.2 °C) (Temporal)   Resp 16   Ht 5' 5\" (1.651 m)   Wt 237 lb (107.5 kg)   SpO2 97%   BMI 39.44 kg/m²     General:  Well appearing male no acute distress  HEENT:   PERRL,normal conjunctiva. External ear and canals normal, TMs normal.  Hearing normal to voice. Nose without edema or discharge, normal septum. Lips, teeth, gums normal.  Oropharynx: no erythema, no exudates, no lesions, normal tongue. Neck:  Supple. Thyroid normal size, nontender, without nodules. No carotid bruit. No masses or lymphadenopathy  Respiratory: no respiratory distress,  no wheezing, no rhonchi, no rales. No chest wall tenderness. Cardiovascular:  RRR, normal S1S2, no murmur. Gastrointestinal: normal bowel sounds, soft, nontender, without masses. No hepatosplenomegaly. Extremities +2 pulses, no edema, normal sensation   Musculoskeletal:  Normal gait. Normal digits and nails. Normal strength and tone, no atrophy, and no abnormal movement. Skin:  No rash, no lesions, no ulcers. Skin warm, normal turgor, without induration or nodules. Neuro:  A and OX4, fluent speech, cranial nerves normal 2-12. Sensation normal to light touch.   DTR symmetrical  Psych:  Normal affect      Lab Results   Component Value Date/Time    WBC 4.8 11/24/2020 09:06 AM    HGB 15.2 11/24/2020 09:06 AM    HCT 45.0 11/24/2020 09:06 AM    PLATELET 576 30/69/4062 09:06 AM    MCV 88.6 11/24/2020 09:06 AM     Lab Results   Component Value Date/Time    Sodium 139 11/24/2020 09:06 AM    Potassium 4.4 11/24/2020 09:06 AM    Chloride 107 11/24/2020 09:06 AM    CO2 28 11/24/2020 09:06 AM    Anion gap 4 (L) 11/24/2020 09:06 AM    Glucose 97 11/24/2020 09:06 AM    BUN 17 11/24/2020 09:06 AM    Creatinine 1.16 11/24/2020 09:06 AM    BUN/Creatinine ratio 15 11/24/2020 09:06 AM    GFR est AA >60 11/24/2020 09:06 AM    GFR est non-AA >60 11/24/2020 09:06 AM    Calcium 9.1 11/24/2020 09:06 AM     Lab Results   Component Value Date/Time    Cholesterol, total 179 11/24/2020 09:06 AM    HDL Cholesterol 81 11/24/2020 09:06 AM    LDL, calculated 87.2 11/24/2020 09:06 AM    VLDL, calculated 10.8 11/24/2020 09:06 AM    Triglyceride 54 11/24/2020 09:06 AM    CHOL/HDL Ratio 2.2 11/24/2020 09:06 AM     Lab Results   Component Value Date/Time    TSH 1.94 11/24/2020 09:06 AM     Lab Results   Component Value Date/Time    Hemoglobin A1c 5.6 11/24/2020 09:06 AM     Lab Results   Component Value Date/Time    Vitamin D 25-Hydroxy 26.1 (L) 11/24/2020 09:06 AM                   Assessment and Plan:     1. Severe obesity ([address]  Counseled on healthy diet and regular exercise  - HEMOGLOBIN A1C WITH EAG; Future  - LIPID PANEL; Future    2. Low testosterone in male  - TESTOSTERONE, FREE & TOTAL; Future  - FERRITIN; Future  - IRON PROFILE; Future  - TSH 3RD GENERATION; Future  - FSH AND LH; Future  - PROLACTIN; Future  - CORTISOL, AM; Future  - METABOLIC PANEL, COMPREHENSIVE; Future  - CBC WITH AUTOMATED DIFF; Future  - PSA SCREENING (SCREENING); Future    3. Vitamin D deficiency  - VITAMIN D, 25 HYDROXY; Future    4. Screening cholesterol level  - LIPID PANEL; Future    5. Encounter for hepatitis C screening test for low risk patient  - HEPATITIS C AB; Future    6. Screening for malignant neoplasm of colon  - REFERRAL FOR COLONOSCOPY    7. Erectile dysfunction due to diseases classified elsewhere  - sildenafil citrate (Viagra) 50 mg tablet; Take 1 Tablet by mouth daily as needed for Erectile Dysfunction. Dispense: 6 Tablet; Refill: 1      Follow-up and Dispositions    · Return in about 3 months (around 8/24/2022).           Sherry Troy MD

## 2022-05-27 ENCOUNTER — LAB ONLY (OUTPATIENT)
Dept: INTERNAL MEDICINE CLINIC | Age: 52
End: 2022-05-27

## 2022-05-27 DIAGNOSIS — E66.01 SEVERE OBESITY (HCC): ICD-10-CM

## 2022-05-27 DIAGNOSIS — E55.9 VITAMIN D DEFICIENCY: ICD-10-CM

## 2022-05-27 DIAGNOSIS — Z11.59 ENCOUNTER FOR HEPATITIS C SCREENING TEST FOR LOW RISK PATIENT: ICD-10-CM

## 2022-05-27 DIAGNOSIS — R79.89 LOW TESTOSTERONE IN MALE: ICD-10-CM

## 2022-05-27 DIAGNOSIS — Z13.220 SCREENING CHOLESTEROL LEVEL: ICD-10-CM

## 2022-05-28 LAB
25(OH)D3 SERPL-MCNC: 25.4 NG/ML (ref 30–100)
ALBUMIN SERPL-MCNC: 3.8 G/DL (ref 3.5–5)
ALBUMIN/GLOB SERPL: 1.2 {RATIO} (ref 1.1–2.2)
ALP SERPL-CCNC: 112 U/L (ref 45–117)
ALT SERPL-CCNC: 32 U/L (ref 12–78)
ANION GAP SERPL CALC-SCNC: 5 MMOL/L (ref 5–15)
AST SERPL-CCNC: 33 U/L (ref 15–37)
BASOPHILS # BLD: 0 K/UL (ref 0–0.1)
BASOPHILS NFR BLD: 0 % (ref 0–1)
BILIRUB SERPL-MCNC: 0.7 MG/DL (ref 0.2–1)
BUN SERPL-MCNC: 16 MG/DL (ref 6–20)
BUN/CREAT SERPL: 13 (ref 12–20)
CALCIUM SERPL-MCNC: 8.7 MG/DL (ref 8.5–10.1)
CHLORIDE SERPL-SCNC: 107 MMOL/L (ref 97–108)
CHOLEST SERPL-MCNC: 179 MG/DL
CO2 SERPL-SCNC: 27 MMOL/L (ref 21–32)
CORTIS AM PEAK SERPL-MCNC: 7.3 UG/DL (ref 4.3–22.45)
CREAT SERPL-MCNC: 1.21 MG/DL (ref 0.7–1.3)
DIFFERENTIAL METHOD BLD: ABNORMAL
EOSINOPHIL # BLD: 0.1 K/UL (ref 0–0.4)
EOSINOPHIL NFR BLD: 2 % (ref 0–7)
ERYTHROCYTE [DISTWIDTH] IN BLOOD BY AUTOMATED COUNT: 13.1 % (ref 11.5–14.5)
EST. AVERAGE GLUCOSE BLD GHB EST-MCNC: 120 MG/DL
FERRITIN SERPL-MCNC: 157 NG/ML (ref 26–388)
FSH SERPL-ACNC: 2.7 MIU/ML
GLOBULIN SER CALC-MCNC: 3.2 G/DL (ref 2–4)
GLUCOSE SERPL-MCNC: 99 MG/DL (ref 65–100)
HBA1C MFR BLD: 5.8 % (ref 4–5.6)
HCT VFR BLD AUTO: 46.4 % (ref 36.6–50.3)
HCV AB SERPL QL IA: NONREACTIVE
HDLC SERPL-MCNC: 72 MG/DL
HDLC SERPL: 2.5 {RATIO} (ref 0–5)
HGB BLD-MCNC: 15.5 G/DL (ref 12.1–17)
IMM GRANULOCYTES # BLD AUTO: 0 K/UL (ref 0–0.04)
IMM GRANULOCYTES NFR BLD AUTO: 0 % (ref 0–0.5)
IRON SATN MFR SERPL: 32 % (ref 20–50)
IRON SERPL-MCNC: 107 UG/DL (ref 35–150)
LDLC SERPL CALC-MCNC: 95.2 MG/DL (ref 0–100)
LH SERPL-ACNC: 2.9 MIU/ML
LYMPHOCYTES # BLD: 1.6 K/UL (ref 0.8–3.5)
LYMPHOCYTES NFR BLD: 33 % (ref 12–49)
MCH RBC QN AUTO: 30.3 PG (ref 26–34)
MCHC RBC AUTO-ENTMCNC: 33.4 G/DL (ref 30–36.5)
MCV RBC AUTO: 90.8 FL (ref 80–99)
MONOCYTES # BLD: 0.7 K/UL (ref 0–1)
MONOCYTES NFR BLD: 14 % (ref 5–13)
NEUTS SEG # BLD: 2.3 K/UL (ref 1.8–8)
NEUTS SEG NFR BLD: 51 % (ref 32–75)
NRBC # BLD: 0 K/UL (ref 0–0.01)
NRBC BLD-RTO: 0 PER 100 WBC
PLATELET # BLD AUTO: 202 K/UL (ref 150–400)
PMV BLD AUTO: 11.6 FL (ref 8.9–12.9)
POTASSIUM SERPL-SCNC: 4.5 MMOL/L (ref 3.5–5.1)
PROLACTIN SERPL-MCNC: 10.3 NG/ML
PROT SERPL-MCNC: 7 G/DL (ref 6.4–8.2)
PSA SERPL-MCNC: 0.5 NG/ML (ref 0.01–4)
RBC # BLD AUTO: 5.11 M/UL (ref 4.1–5.7)
SODIUM SERPL-SCNC: 139 MMOL/L (ref 136–145)
TIBC SERPL-MCNC: 331 UG/DL (ref 250–450)
TRIGL SERPL-MCNC: 59 MG/DL (ref ?–150)
TSH SERPL DL<=0.05 MIU/L-ACNC: 1.95 UIU/ML (ref 0.36–3.74)
VLDLC SERPL CALC-MCNC: 11.8 MG/DL
WBC # BLD AUTO: 4.7 K/UL (ref 4.1–11.1)

## 2022-05-29 LAB
TESTOST FREE SERPL-MCNC: 10.1 PG/ML (ref 7.2–24)
TESTOST SERPL-MCNC: 250 NG/DL (ref 264–916)

## 2022-05-31 NOTE — PROGRESS NOTES
Testosterone is mildly low - from our discussion in the office you did not desire to have this repleted due to possible side effects including increased risk of prostrate cancer, blood clots.    If patient changes his mind I can refer to endocrinologist    Vitamin D is low increase intake by 1000 units of vitamin D3 daily  PSA is normal  Cholesterol is normal  Normal blood count  Normal kidney and liver function   Blood sugar in pre diabetic range work on low sugar diet

## 2022-08-30 ENCOUNTER — OFFICE VISIT (OUTPATIENT)
Dept: INTERNAL MEDICINE CLINIC | Age: 52
End: 2022-08-30
Payer: MEDICAID

## 2022-08-30 VITALS
RESPIRATION RATE: 18 BRPM | HEART RATE: 68 BPM | HEIGHT: 65 IN | SYSTOLIC BLOOD PRESSURE: 139 MMHG | BODY MASS INDEX: 39.82 KG/M2 | OXYGEN SATURATION: 98 % | DIASTOLIC BLOOD PRESSURE: 89 MMHG | WEIGHT: 239 LBS | TEMPERATURE: 98 F

## 2022-08-30 DIAGNOSIS — N52.9 ERECTILE DYSFUNCTION, UNSPECIFIED ERECTILE DYSFUNCTION TYPE: ICD-10-CM

## 2022-08-30 DIAGNOSIS — E66.01 SEVERE OBESITY (HCC): ICD-10-CM

## 2022-08-30 DIAGNOSIS — I10 ESSENTIAL HYPERTENSION: Primary | ICD-10-CM

## 2022-08-30 PROCEDURE — 99213 OFFICE O/P EST LOW 20 MIN: CPT | Performed by: INTERNAL MEDICINE

## 2022-08-30 RX ORDER — POLYETHYLENE GLYCOL 3350, SODIUM CHLORIDE, SODIUM BICARBONATE, POTASSIUM CHLORIDE 420; 11.2; 5.72; 1.48 G/4L; G/4L; G/4L; G/4L
POWDER, FOR SOLUTION ORAL AS DIRECTED
COMMUNITY
Start: 2022-07-12 | End: 2022-10-31

## 2022-08-30 NOTE — PROGRESS NOTES
Tia Hernandez is a 47 y.o. male  Chief Complaint   Patient presents with    Follow-up     1. Have you been to the ER, urgent care clinic since your last visit? Hospitalized since your last visit? No    2. Have you seen or consulted any other health care providers outside of the [address] [place] [address] since your last visit? Include any pap smears or colon screening.  No  Health Maintenance   Topic Date Due    DTaP/Tdap/Td series (1 - Tdap) Never done    Colorectal Cancer Screening Combo  Never done    Shingrix Vaccine Age 50> (1 of 2) Never done    COVID-19 Vaccine (3 - Booster for Moderna series) 11/08/2021    Flu Vaccine (1) 09/01/2022    Depression Screen  05/24/2023    A1C test (Diabetic or Prediabetic)  05/27/2023    Lipid Screen  05/27/2027    Hepatitis C Screening  Completed    Pneumococcal 0-64 years  Aged Out     Visit Vitals  BP (!) 157/96 (BP 1 Location: Right arm, BP Patient Position: At rest, BP Cuff Size: Large adult)   Pulse 68   Temp 98 °F (36.7 °C) (Skin)   Resp 18   Ht 5' 5\" (1.651 m)   Wt 239 lb (108.4 kg)   SpO2 98%   BMI 39.77 kg/m²

## 2022-08-30 NOTE — PROGRESS NOTES
Mr. Gay Reveles is presenting to follow up     CC:  Follow-up       HPI:  ED : previous visit started on viagra \" did not work\"   Low testosterone -offered endocrine referral and or urology patient declines    He does not want Testerone repletion du to risk of prostate cancer, discussed there may other options wants to hold off now    He is exercising daily and lifting weights     Pre diabetes HA1C 5.8 - discussed eating healthy, reports cutting back on carbs  Weight is is stable    Pre hypertension: BP is higher today 139/89  Discussed should start blood pressure medication   Monitors at home and reports BP is 120/80 sometimes diastolic is 90  Denies chest pain and dyspnea    Colonoscopy scheduled November 1st   Declines shinglex vaccine and Tdap    Review of systems:  Constitutional: negative for fever, chills, weight loss, night sweats   Eyes : negative for vision changes, eye pain and discharge  Nose and Throat: negative for tinnitus, sore throat   Cardiovascular: negative for chest pain, palpitations and shortness of breath  Respiratory: negative for shortness of breath, cough and wheezing   Gastroinstestinal: negative for abdominal pain, nausea, vomiting, diarrhea, constipation, and blood in the stool  Musculoskeletal: negative for back ache and joint ache   Genitourinary: negative for dysuria, nocturia, polyuria and hematuria   Neurologic: Negative for focal weakness, numbness or incoordination  Skin: negative for rash, pruritus  Hematologic: negative for easy bruising      Past Medical History:   Diagnosis Date    Hypertension     Pneumonia 12/2018    Sleep apnea         No past surgical history on file. No Known Allergies    Current Outpatient Medications on File Prior to Visit   Medication Sig Dispense Refill    peg-electrolyte soln (NULYTELY) 420 gram solution as directed. sildenafil citrate (Viagra) 50 mg tablet Take 1 Tablet by mouth daily as needed for Erectile Dysfunction.  6 Tablet 1 creatine monohydrate (CREATINE PO) Take 5 g by mouth. Indications: powder      protein supplement (PROTEIN PO) Take  by mouth. Indications: powder      OTHER Indications: prework out powder      ibuprofen (MOTRIN) 200 mg tablet Take 600 mg by mouth as needed for Pain. (Patient not taking: Reported on 8/30/2022)       No current facility-administered medications on file prior to visit. family history includes Cancer in his father, maternal grandmother, maternal uncle, paternal grandfather, and paternal grandmother; Diabetes in his mother; Heart Attack in his mother; Heart Disease in his mother; High Cholesterol in his mother; Hypertension in his mother; No Known Problems in his brother, maternal grandfather, and sister.     Social History     Socioeconomic History    Marital status:      Spouse name: Not on file    Number of children: Not on file    Years of education: Not on file    Highest education level: Not on file   Occupational History    Not on file   Tobacco Use    Smoking status: Never    Smokeless tobacco: Never   Vaping Use    Vaping Use: Never used   Substance and Sexual Activity    Alcohol use: No    Drug use: No    Sexual activity: Yes     Partners: Female   Other Topics Concern    Not on file   Social History Narrative    Not on file     Social Determinants of Health     Financial Resource Strain: Medium Risk    Difficulty of Paying Living Expenses: Somewhat hard   Food Insecurity: Food Insecurity Present    Worried About Running Out of Food in the Last Year: Sometimes true    Ran Out of Food in the Last Year: Sometimes true   Transportation Needs: Not on file   Physical Activity: Not on file   Stress: Not on file   Social Connections: Not on file   Intimate Partner Violence: Not on file   Housing Stability: Not on file       Visit Vitals  BP (!) 146/93   Pulse 68   Temp 98 °F (36.7 °C) (Skin)   Resp 18   Ht 5' 5\" (1.651 m)   Wt 239 lb (108.4 kg)   SpO2 98%   BMI 39.77 kg/m²     General: Well appearing male no acute distress  HEENT:   PERRL,normal conjunctiva. External ear and canals normal, TMs normal.  Hearing normal to voice. Nose without edema or discharge, normal septum. Lips, teeth, gums normal.  Oropharynx: no erythema, no exudates, no lesions, normal tongue. Neck:  Supple. Thyroid normal size, nontender, without nodules. No carotid bruit. No masses or lymphadenopathy  Respiratory: no respiratory distress,  no wheezing, no rhonchi, no rales. No chest wall tenderness. Cardiovascular:  RRR, normal S1S2, no murmur. Gastrointestinal: normal bowel sounds, soft, nontender, without masses. No hepatosplenomegaly. Extremities +2 pulses, no edema, normal sensation   Musculoskeletal:  Normal gait. Normal digits and nails. Normal strength and tone, no atrophy, and no abnormal movement. Skin:  No rash, no lesions, no ulcers. Skin warm, normal turgor, without induration or nodules. Neuro:  A and OX4, fluent speech, cranial nerves normal 2-12. Sensation normal to light touch.   DTR symmetrical  Psych:  Normal affect      Lab Results   Component Value Date/Time    WBC 4.7 05/27/2022 08:13 AM    HGB 15.5 05/27/2022 08:13 AM    HCT 46.4 05/27/2022 08:13 AM    PLATELET 811 99/67/7425 08:13 AM    MCV 90.8 05/27/2022 08:13 AM     Lab Results   Component Value Date/Time    Sodium 139 05/27/2022 08:13 AM    Potassium 4.5 05/27/2022 08:13 AM    Chloride 107 05/27/2022 08:13 AM    CO2 27 05/27/2022 08:13 AM    Anion gap 5 05/27/2022 08:13 AM    Glucose 99 05/27/2022 08:13 AM    BUN 16 05/27/2022 08:13 AM    Creatinine 1.21 05/27/2022 08:13 AM    BUN/Creatinine ratio 13 05/27/2022 08:13 AM    GFR est AA >60 05/27/2022 08:13 AM    GFR est non-AA >60 05/27/2022 08:13 AM    Calcium 8.7 05/27/2022 08:13 AM     Lab Results   Component Value Date/Time    Cholesterol, total 179 05/27/2022 08:13 AM    HDL Cholesterol 72 05/27/2022 08:13 AM    LDL, calculated 95.2 05/27/2022 08:13 AM    VLDL, calculated 11.8 05/27/2022 08:13 AM    Triglyceride 59 05/27/2022 08:13 AM    CHOL/HDL Ratio 2.5 05/27/2022 08:13 AM     Lab Results   Component Value Date/Time    TSH 1.95 05/27/2022 08:13 AM     Lab Results   Component Value Date/Time    Hemoglobin A1c 5.8 (H) 05/27/2022 08:13 AM     Lab Results   Component Value Date/Time    Vitamin D 25-Hydroxy 25.4 (L) 05/27/2022 08:13 AM                   Assessment and Plan:     1. Essential hypertension  Blood pressure in stage 1 hypertensive range patient declines starting medication wants to continue working on lifestyle changes exercises and diet    2. Severe obesity ([address]  Counseled on weight loss   Prediabetes - counseled on low sugar diet    3.  Erectile dysfunction, unspecified erectile dysfunction type  -low testosterone, reports viagra was not helpful offered urology referral declines           Jerry Huitron MD

## 2022-08-30 NOTE — PATIENT INSTRUCTIONS
check blood pressure at least 3 times per week. Write down your numbers for review. Check blood pressure in the morning and evening. Rest for 5 minutes with feet elevated and arm resting on a table (at mid-chest level) when checking blood pressure    -Exercise 30-60 minutes most days of the week, preferably with a mix of cardiovascular and strength training. Exercise can improve blood pressure, even if you do not lose weight!    -Limit alcohol intake to less than 2-3 drinks daily     -Avoid tobacco products    -Avoid caffeine, energy drinks, stimulants    -DASH diet - includes fruits, vegetables, fiber, and less than 2000 mg sodium (salt) daily. Try to eat less than 4252-0702 calories daily.  Limit fat intake.     -Avoid non-steroidal inflammatory medications (NSAIDS) such as ibuprofen, advil, motrin, aleve, and naproxyn as these may increase blood pressure if used long-term    -Avoid OTC decongestants such as pseudopherine, phenylephrine, Afrin

## 2022-11-01 ENCOUNTER — ANESTHESIA (OUTPATIENT)
Dept: ENDOSCOPY | Age: 52
End: 2022-11-01
Payer: MEDICAID

## 2022-11-01 ENCOUNTER — HOSPITAL ENCOUNTER (OUTPATIENT)
Age: 52
Setting detail: OUTPATIENT SURGERY
Discharge: HOME | End: 2022-11-01
Attending: INTERNAL MEDICINE | Admitting: INTERNAL MEDICINE
Payer: MEDICAID

## 2022-11-01 ENCOUNTER — ANESTHESIA EVENT (OUTPATIENT)
Dept: ENDOSCOPY | Age: 52
End: 2022-11-01
Payer: MEDICAID

## 2022-11-01 VITALS
SYSTOLIC BLOOD PRESSURE: 128 MMHG | WEIGHT: 237.1 LBS | RESPIRATION RATE: 18 BRPM | BODY MASS INDEX: 39.5 KG/M2 | OXYGEN SATURATION: 96 % | TEMPERATURE: 98 F | HEART RATE: 68 BPM | DIASTOLIC BLOOD PRESSURE: 82 MMHG | HEIGHT: 65 IN

## 2022-11-01 PROCEDURE — 74011250636 HC RX REV CODE- 250/636: Performed by: ANESTHESIOLOGY

## 2022-11-01 PROCEDURE — 74011250636 HC RX REV CODE- 250/636: Performed by: INTERNAL MEDICINE

## 2022-11-01 PROCEDURE — 76040000019: Performed by: INTERNAL MEDICINE

## 2022-11-01 PROCEDURE — 76060000031 HC ANESTHESIA FIRST 0.5 HR: Performed by: INTERNAL MEDICINE

## 2022-11-01 PROCEDURE — 2709999900 HC NON-CHARGEABLE SUPPLY: Performed by: INTERNAL MEDICINE

## 2022-11-01 PROCEDURE — 74011000250 HC RX REV CODE- 250: Performed by: ANESTHESIOLOGY

## 2022-11-01 RX ORDER — EPINEPHRINE 0.1 MG/ML
1 INJECTION INTRACARDIAC; INTRAVENOUS
Status: DISCONTINUED | OUTPATIENT
Start: 2022-11-01 | End: 2022-11-01 | Stop reason: HOSPADM

## 2022-11-01 RX ORDER — DEXTROMETHORPHAN/PSEUDOEPHED 2.5-7.5/.8
1.2 DROPS ORAL
Status: DISCONTINUED | OUTPATIENT
Start: 2022-11-01 | End: 2022-11-01 | Stop reason: HOSPADM

## 2022-11-01 RX ORDER — NALOXONE HYDROCHLORIDE 0.4 MG/ML
0.4 INJECTION, SOLUTION INTRAMUSCULAR; INTRAVENOUS; SUBCUTANEOUS
Status: DISCONTINUED | OUTPATIENT
Start: 2022-11-01 | End: 2022-11-01 | Stop reason: HOSPADM

## 2022-11-01 RX ORDER — LIDOCAINE HYDROCHLORIDE 20 MG/ML
INJECTION, SOLUTION EPIDURAL; INFILTRATION; INTRACAUDAL; PERINEURAL AS NEEDED
Status: DISCONTINUED | OUTPATIENT
Start: 2022-11-01 | End: 2022-11-01 | Stop reason: HOSPADM

## 2022-11-01 RX ORDER — PROPOFOL 10 MG/ML
INJECTION, EMULSION INTRAVENOUS AS NEEDED
Status: DISCONTINUED | OUTPATIENT
Start: 2022-11-01 | End: 2022-11-01 | Stop reason: HOSPADM

## 2022-11-01 RX ORDER — DIPHENHYDRAMINE HYDROCHLORIDE 50 MG/ML
50 INJECTION, SOLUTION INTRAMUSCULAR; INTRAVENOUS ONCE
Status: DISCONTINUED | OUTPATIENT
Start: 2022-11-01 | End: 2022-11-01 | Stop reason: HOSPADM

## 2022-11-01 RX ORDER — MIDAZOLAM HYDROCHLORIDE 1 MG/ML
.25-5 INJECTION, SOLUTION INTRAMUSCULAR; INTRAVENOUS
Status: DISCONTINUED | OUTPATIENT
Start: 2022-11-01 | End: 2022-11-01 | Stop reason: HOSPADM

## 2022-11-01 RX ORDER — ATROPINE SULFATE 0.1 MG/ML
0.5 INJECTION INTRAVENOUS
Status: DISCONTINUED | OUTPATIENT
Start: 2022-11-01 | End: 2022-11-01 | Stop reason: HOSPADM

## 2022-11-01 RX ORDER — SODIUM CHLORIDE 9 MG/ML
125 INJECTION, SOLUTION INTRAVENOUS CONTINUOUS
Status: DISCONTINUED | OUTPATIENT
Start: 2022-11-01 | End: 2022-11-01 | Stop reason: HOSPADM

## 2022-11-01 RX ORDER — FLUMAZENIL 0.1 MG/ML
0.2 INJECTION INTRAVENOUS
Status: DISCONTINUED | OUTPATIENT
Start: 2022-11-01 | End: 2022-11-01 | Stop reason: HOSPADM

## 2022-11-01 RX ADMIN — LIDOCAINE HYDROCHLORIDE 40 MG: 20 INJECTION, SOLUTION EPIDURAL; INFILTRATION; INTRACAUDAL; PERINEURAL at 10:21

## 2022-11-01 RX ADMIN — PROPOFOL 220 MG: 10 INJECTION, EMULSION INTRAVENOUS at 10:37

## 2022-11-01 RX ADMIN — SODIUM CHLORIDE 125 ML/HR: 9 INJECTION, SOLUTION INTRAVENOUS at 09:35

## 2022-11-01 NOTE — H&P
[facility]  Outpatient History and Physical    Patient: [facility]    Physician: Satnam Chacon MD    Vital Signs: Blood pressure (!) 148/87, pulse 87, temperature 98.3 °F (36.8 °C), resp. rate 21, height 5' 5\" (1.651 m), weight 107.5 kg (237 lb 1.6 oz), SpO2 97 %. Allergies:   No Known Allergies    Chief Complaint: screening colonoscopy    History:  Past Medical History:   Diagnosis Date    Hypertension     Pneumonia 12/2018    Pre-diabetes     Sleep apnea     no cpap      History reviewed. No pertinent surgical history. Social History     Socioeconomic History    Marital status:    Tobacco Use    Smoking status: Never    Smokeless tobacco: Never   Vaping Use    Vaping Use: Never used   Substance and Sexual Activity    Alcohol use: No    Drug use: No    Sexual activity: Yes     Partners: Female     Social Determinants of Health     Financial Resource Strain: Medium Risk    Difficulty of Paying Living Expenses: Somewhat hard   Food Insecurity: Food Insecurity Present    Worried About Running Out of Food in the Last Year: Sometimes true    Ran Out of Food in the Last Year: Sometimes true      Family History   Problem Relation Age of Onset    Heart Attack Mother         in her 46s    High Cholesterol Mother     Hypertension Mother     Heart Disease Mother         hx bypass    Diabetes Mother     Cancer Father         lymphoma    No Known Problems Sister     No Known Problems Brother     Cancer Maternal Grandmother         unknown cancer    No Known Problems Maternal Grandfather     Cancer Paternal Grandmother         unknown cancer    Cancer Paternal Grandfather         unknown cancer    Cancer Maternal Uncle         unknown cancer    Prostate Cancer Neg Hx     Colon Cancer Neg Hx        Medications:   Prior to Admission medications    Medication Sig Start Date End Date Taking? Authorizing Provider   creatine monohydrate (CREATINE PO) Take 5 g by mouth.  Indications: powder   Yes Provider, Historical   protein supplement (PROTEIN PO) Take  by mouth. Indications: powder   Yes Provider, Historical   OTHER Indications: prework out powder    Provider, Historical   ibuprofen (MOTRIN) 200 mg tablet Take 600 mg by mouth as needed for Pain. Provider, Historical       Physical Exam:   General: alert, no distress   HEENT: Head: Normocephalic, no lesions, without obvious abnormality. Heart: regular rate and rhythm, S1, S2 normal, no murmur, click, rub or gallop   Lungs: chest clear, no wheezing, rales, normal symmetric air entry   Abdominal: Obese. Soft. Nontender. Bowel sounds are normal, liver is not enlarged, spleen is not enlarged   Neurological: Grossly normal   Extremities: extremities normal, atraumatic, no cyanosis or edema     Plan of Care/Planned Procedure: colonoscopy    The heart, lungs and mental status were satisfactory for the administration of MAC sedation and for the procedure. Informed consent was obtained for the procedure, including sedation. Risks of perforation, hemorrhage, adverse drug reaction, and aspiration were discussed. The risks, benefits and alternatives were again reiterated to the patient to include the risk of infection, bleeding, medication reaction, aspiration, perforation which could require immediate surgery, cardiopulmonary complication, issues with anesthesia and death. The patient was counseled at length about the risks of renetta Covid-19 in the tavares-operative and post-operative states including the recovery window of their procedure. The patient was made aware that renetta Covid-19 after a surgical procedure may worsen their prognosis for recovering from the virus and lend to a higher morbidity and or mortality risk. The patient was given the options of postponing their procedure. All of the risks, benefits, and alternatives were discussed. The patient does  wish to proceed with the procedure.

## 2022-11-01 NOTE — PROGRESS NOTES
Endoscope was pre-cleaned at the bedside immediately following procedure by Mini Newberry     Phone transported to recovery with patient        .

## 2022-11-01 NOTE — PERIOP NOTES
Medications       lidocaine (PF) 2% (mg)       Date/Time Rate/Dose/Volume Action Route Admin User Audit       11/01/22  1021 40 mg Given IntraVENous Villeda Mariajose ELIZABETH DO                propofol 10 mg/mL (mg)       Date/Time Rate/Dose/Volume Action Route Admin User Audit       11/01/22  1037 220 mg Given IntraVENous Ronal ELIZABETH DO      Comment: titrated over case                0.9% sodium chloride infusion (mL) Dosing weight:  107.5      Date/Time Rate/Dose/Volume Action Route Admin User Audit       11/01/22  [address] [place], [place]      Comment: Switch to gravity       1039 400 mL Restarted IntraVENous Villeda Mariajose ELIZABETH DO

## 2022-11-01 NOTE — ROUTINE PROCESS
[facility] Corey  1970  [id number]    Situation:  Verbal report received from: CACHORRO Contreras RN  Procedure: Procedure(s):  COLONOSCOPY    Background:    Preoperative diagnosis: SCREENING  Postoperative diagnosis: Colon: normal     :  Dr. Wesley Stiles  Assistant(s): Endoscopy Technician-1: Indio Hernandes  Endoscopy RN-1: Dottie Grant RN    Specimens: * No specimens in log *  H. Pylori  no    Assessment:  Intra-procedure medications     Anesthesia gave intra-procedure sedation and medications, see anesthesia flow sheet yes    Intravenous fluids: NS@ KVO     Vital signs stable   yes    Abdominal assessment: round and soft   yes    Recommendation:  Discharge patient per MD order  yes.     Family or Barbette Rosalie, wife  Permission to share finding with family or friend yes

## 2022-11-01 NOTE — DISCHARGE INSTRUCTIONS
Crystal Abbott  [id number]  1970    It was my pleasure seeing you for your procedure. You will also receive a summary report with the findings from this procedure and any further recommendations. If you had polyps removed or biopsies taken during your procedure, you will receive a separate letter from me within the next 2 weeks. If you don't receive this letter or if you have any questions, please call my office [phone]. Please take note of the post procedure instructions listed below. Best Wishes,    Dr. Ashanti Georges    These instructions give you information on caring for yourself after your procedure. Call your doctor if you have any problems or questions after your procedure. HOME CARE  Walk if you have belly cramping or gas. Walking will help get rid of the air and reduce the bloated feeling in your belly (abdomen). Your IV site (where you received drugs) may be tender to touch. Place warm towels on the site; keep your arm up on two pillows if you have any swelling or soreness in the area. You may shower. ACTIVITY:  Take frequent rest periods and move at a slower pace for the next 24 hours. .  You may resume your regular activity tomorrow if you are feeling back to normal.  Do not drive or ride a bicycle for at least 24 hours (because of the medicine (anesthesia) used during the test). Do not sign any important legal documents or use or operate any machinery for 24 hours  Do not take sleeping medicines/nerve drugs for 24 hours unless the doctor tells you. You can return to work/school tomorrow unless otherwise instructed. NUTRITION:  Drink plenty of fluids to keep your pee (urine) clear or pale yellow  Begin with a light meal and progress to your normal diet. Heavy or fried foods are harder to digest and may make you feel sick to your stomach (nauseated).   Once you are feeling back to normal, you may resume your normal diet as instructed by your doctor. Avoid alcoholic beverages for 24 hours or as instructed. IF YOU HAD BIOPSIES TAKEN OR POLYPS REMOVED DURING THE PROCEDURE:  For the next 7 days, avoid all non-steroidal antiinflammatory medications such as Ibuprofen, Motrin, Advil, Alleve, Karey-seltzer, Goody's powder, BC powder. If you do not have an heart condition that requires you to take a daily aspirin, you should avoid taking aspirin for 7 days. Eat a soft diet for 24 hours. Monitor your stools for any blood or dark black, tar-like, stools as this may be a sign of bleeding and if you see any blood, notify your doctor immediately. GET HELP RIGHT AWAY AND SEEK IMMEDIATE MEDICAL CARE IF:  You have more than a spotting of blood in your stool. You pass clumps of tissue (blood clots) or fill the toilet with blood. Your belly is painfully swollen or puffy (abdominal distention). You throw up (vomit). You have a fever. You have redness, pain or swelling at the IV site that last greater than two days. You have abdominal pain or discomfort that is severe or gets worse throughout the day.     Post-procedure diagnosis:  Colon: normal     Post-procedure recommeded      Patient Education on Sedation / Analgesia Administered for Procedure      For 24 hours after general anesthesia or intravenous analgesia / sedation:  Have someone responsible help you with your care  Limit your activities  Do not drive and operate hazardous machinery  Do not make important personal, legal or business decisions  Do not drink alcoholic beverages  If you have not urinated within 8 hours after discharge, please contact your physician  Resume your medications unless otherwise instructed    For 24 hours after general anesthesia or intravenous analgesia / sedation  you may experience:  Drowsiness, dizziness, sleepiness, or confusion  Difficulty remembering or delayed reaction times  Difficulty with your balance, especially while walking, move slowly and carefully, do not make sudden position changes  Difficulty focusing or blurred vision    You may not be aware of slight changes in your behavior and/or your reaction time because of the medication used during and after your procedure. Report the following to your physician:  Excessive pain, swelling, redness or odor of or around the surgical area  Temperature over 100.5  Nausea and vomiting lasting longer than 4 hours or if unable to take medications  Any signs of decreased circulation or nerve impairment to extremity: change in color, persistent numbness, tingling, coldness or increase pain  Any questions or concerns    IF YOU REPORT TO AN EMERGENCY ROOM, DOCTOR'S OFFICE OR HOSPITAL WITHIN 24 HOURS AFTER YOUR PROCEDURE, BRING THIS SHEET AND YOUR AFTER VISIT SUMMARY WITH YOU AND GIVE IT TO THE PHYSICIAN OR NURSE ATTENDING YOU. Learning About Coronavirus [phone])  Coronavirus [phone]): Overview  What is coronavirus (COVID-19)? The coronavirus disease (COVID-19) is caused by a virus. It is an illness that was first found in [place], [place], in December 2019. It has since spread worldwide. The virus can cause fever, cough, and trouble breathing. In severe cases, it can cause pneumonia and make it hard to breathe without help. It can cause death. Coronaviruses are a large group of viruses. They cause the common cold. They also cause more serious illnesses like Middle East respiratory syndrome (MERS) and severe acute respiratory syndrome (SARS). COVID-19 is caused by a novel coronavirus. That means it's a new type that has not been seen in people before. This virus spreads person-to-person through droplets from coughing and sneezing. It can also spread when you are close to someone who is infected. And it can spread when you touch something that has the virus on it, such as a doorknob or a tabletop. What can you do to protect yourself from coronavirus (COVID-19)?   The best way to protect yourself from getting sick is to: Avoid areas where there is an outbreak. Avoid contact with people who may be infected. Wash your hands often with soap or alcohol-based hand sanitizers. Avoid crowds and try to stay at least 6 feet away from other people. Wash your hands often, especially after you cough or sneeze. Use soap and water, and scrub for at least 20 seconds. If soap and water aren't available, use an alcohol-based hand . Avoid touching your mouth, nose, and eyes. What can you do to avoid spreading the virus to others? To help avoid spreading the virus to others:  Cover your mouth with a tissue when you cough or sneeze. Then throw the tissue in the trash. Use a disinfectant to clean things that you touch often. Stay home if you are sick or have been exposed to the virus. Don't go to school, work, or public areas. And don't use public transportation. If you are sick:  Leave your home only if you need to get medical care. But call the doctor's office first so they know you're coming. And wear a face mask, if you have one. If you have a face mask, wear it whenever you're around other people. It can help stop the spread of the virus when you cough or sneeze. Clean and disinfect your home every day. Use household  and disinfectant wipes or sprays. Take special care to clean things that you grab with your hands. These include doorknobs, remote controls, phones, and handles on your refrigerator and microwave. And don't forget countertops, tabletops, bathrooms, and computer keyboards. When to call for help  Call 911 anytime you think you may need emergency care. For example, call if:  You have severe trouble breathing. (You can't talk at all.)  You have constant chest pain or pressure. You are severely dizzy or lightheaded. You are confused or can't think clearly. Your face and lips have a blue color. You pass out (lose consciousness) or are very hard to wake up.   Call your doctor now if you develop symptoms such as:  Shortness of breath. Fever. Cough. If you need to get care, call ahead to the doctor's office for instructions before you go. Make sure you wear a face mask, if you have one, to prevent exposing other people to the virus. Where can you get the latest information? The following health organizations are tracking and studying this virus. Their websites contain the most up-to-date information. Dominick Learn also learn what to do if you think you may have been exposed to the virus. U.S. Centers for Disease Control and Prevention (CDC): The CDC provides updated news about the disease and travel advice. The website also tells you how to prevent the spread of infection. www.cdc.gov  World Health Organization [facility]): WHO offers information about the virus outbreaks. WHO also has travel advice. www.who.int  Current as of: April 1, 2020               Content Version: 12.4  © 7577-6606 [employer]. Care instructions adapted under license by your healthcare professional. If you have questions about a medical condition or this instruction, always ask your healthcare professional. [address] any warranty or liability for your use of this information.

## 2022-11-01 NOTE — ANESTHESIA PREPROCEDURE EVALUATION
Relevant Problems   ENDOCRINE   (+) Severe obesity (HCC)       Anesthetic History   No history of anesthetic complications            Review of Systems / Medical History  Patient summary reviewed, nursing notes reviewed and pertinent labs reviewed    Pulmonary  Within defined limits      Sleep apnea: No treatment           Neuro/Psych   Within defined limits           Cardiovascular  Within defined limits  Hypertension              Exercise tolerance: >4 METS     GI/Hepatic/Renal  Within defined limits              Endo/Other        Morbid obesity     Other Findings              Physical Exam    Airway  Mallampati: II  TM Distance: 4 - 6 cm  Neck ROM: normal range of motion   Mouth opening: Normal     Cardiovascular  Regular rate and rhythm,  S1 and S2 normal,  no murmur, click, rub, or gallop             Dental  No notable dental hx       Pulmonary  Breath sounds clear to auscultation               Abdominal  GI exam deferred       Other Findings            Anesthetic Plan    ASA: 2  Anesthesia type: general and total IV anesthesia          Induction: Intravenous  Anesthetic plan and risks discussed with: Patient      Propofol MAC

## 2022-11-01 NOTE — PROGRESS NOTES
Endoscopy Case End Note:     Procedure scope was pre-cleaned, per protocol, at bedside by Bettina Driver. Report received from anesthesia. See anesthesia flowsheet for intra-procedure vital signs and events. Belongings remain under stretcher with patient.

## 2022-11-01 NOTE — ANESTHESIA POSTPROCEDURE EVALUATION
Procedure(s):  COLONOSCOPY.    total IV anesthesia    Anesthesia Post Evaluation        Patient location during evaluation: PACU  Note status: Adequate. Level of consciousness: responsive to verbal stimuli and sleepy but conscious  Pain management: satisfactory to patient  Airway patency: patent  Anesthetic complications: no  Cardiovascular status: acceptable  Respiratory status: acceptable  Hydration status: acceptable  Comments: +Post-Anesthesia Evaluation and Assessment    Patient: Edd Darling MRN: [id number]  SSN: [id number]   YOB: 1970  Age: 47 y.o. Sex: male      Cardiovascular Function/Vital Signs    /82   Pulse 68   Temp 36.7 °C (98 °F)   Resp 18   Ht 5' 5\" (1.651 m)   Wt 107.5 kg (237 lb 1.6 oz)   SpO2 96%   BMI 39.46 kg/m²     Patient is status post Procedure(s):  COLONOSCOPY. Nausea/Vomiting: Controlled. Postoperative hydration reviewed and adequate. Pain:  Pain Scale 1: Numeric (0 - 10) (11/01/22 1103)  Pain Intensity 1: 0 (11/01/22 1103)   Managed. Neurological Status: At baseline. Mental Status and Level of Consciousness: Arousable. Pulmonary Status:   O2 Device: None (Room air) (11/01/22 1103)   Adequate oxygenation and airway patent. Complications related to anesthesia: None    Post-anesthesia assessment completed. No concerns. Signed By: Tiffany Lucas MD    11/1/2022  Post anesthesia nausea and vomiting:  controlled      INITIAL Post-op Vital signs:   Vitals Value Taken Time   /82 11/01/22 1103   Temp 36.7 °C (98 °F) 11/01/22 1051   Pulse 67 11/01/22 1105   Resp 16 11/01/22 1105   SpO2 96 % 11/01/22 1105   Vitals shown include unvalidated device data.

## 2022-11-01 NOTE — PROCEDURES
Colonoscopy Procedure Note    Indications:   See Preoperative Diagnosis above  Referring Physician: Ryan Nathan MD  Anesthesia/Sedation: MAC anesthesia Propofol  Endoscopist:  Dr. Donny Pearson  Assistant:  Endoscopy Technician-1: Hoang Clancy  Endoscopy RN-1: Sylvia Brunson RN  Preoperative diagnosis: SCREENING  Postoperative diagnosis: Colon:     Procedure in Detail:  Informed consent was obtained for the procedure, including sedation. Risks of perforation, hemorrhage, adverse drug reaction, and aspiration were discussed. The patient was placed in the left lateral decubitus position. Based on the pre-procedure assessment, including review of the patient's medical history, medications, allergies, and review of systems, he had been deemed to be an appropriate candidate for moderate sedation; he was therefore sedated with the medications listed above. The patient was monitored continuously with ECG tracing, pulse oximetry, blood pressure monitoring, and direct observations. A rectal examination was performed. The [id number] was inserted into the rectum and advanced under direct vision to the cecum, which was identified by the ileocecal valve and appendiceal orifice. The quality of the colonic preparation was inadequate BPS 1/1/0. A careful inspection was made as the colonoscope was withdrawn, including a retroflexed view of the rectum; findings and interventions are described below. Appropriate photodocumentation was obtained. Findings:  JAY: unremarkable  Entire colon filled with semiliquid stool, ascending and cecum with solid stool coating walls unable to lavage. Specimens:    none    EBL: None    Complications: None; patient tolerated the procedure well.     Recommendations:   - Repeat colonoscopy poor preparation protocol within 6-12 months  - discussed w/ Sebastian Bobo    Signed By: Barbara Cheng MD                        November 1, 2022

## 2023-02-23 ENCOUNTER — HOSPITAL ENCOUNTER (EMERGENCY)
Age: 53
Discharge: HOME | End: 2023-02-23
Attending: EMERGENCY MEDICINE
Payer: MEDICAID

## 2023-02-23 ENCOUNTER — APPOINTMENT (OUTPATIENT)
Dept: GENERAL RADIOLOGY | Age: 53
End: 2023-02-23
Attending: EMERGENCY MEDICINE
Payer: MEDICAID

## 2023-02-23 VITALS
DIASTOLIC BLOOD PRESSURE: 84 MMHG | SYSTOLIC BLOOD PRESSURE: 129 MMHG | HEIGHT: 65 IN | OXYGEN SATURATION: 95 % | BODY MASS INDEX: 40.62 KG/M2 | RESPIRATION RATE: 17 BRPM | HEART RATE: 61 BPM | WEIGHT: 243.83 LBS | TEMPERATURE: 98.6 F

## 2023-02-23 DIAGNOSIS — R07.9 CHEST PAIN, UNSPECIFIED TYPE: Primary | ICD-10-CM

## 2023-02-23 LAB
ALBUMIN SERPL-MCNC: 3.7 G/DL (ref 3.5–5)
ALBUMIN/GLOB SERPL: 1 (ref 1.1–2.2)
ALP SERPL-CCNC: 110 U/L (ref 45–117)
ALT SERPL-CCNC: 39 U/L (ref 12–78)
ANION GAP SERPL CALC-SCNC: 4 MMOL/L (ref 5–15)
AST SERPL-CCNC: 23 U/L (ref 15–37)
BASOPHILS # BLD: 0 K/UL (ref 0–0.1)
BASOPHILS NFR BLD: 1 % (ref 0–1)
BILIRUB SERPL-MCNC: 0.5 MG/DL (ref 0.2–1)
BNP SERPL-MCNC: 15 PG/ML
BUN SERPL-MCNC: 15 MG/DL (ref 6–20)
BUN/CREAT SERPL: 13 (ref 12–20)
CALCIUM SERPL-MCNC: 9 MG/DL (ref 8.5–10.1)
CHLORIDE SERPL-SCNC: 106 MMOL/L (ref 97–108)
CO2 SERPL-SCNC: 29 MMOL/L (ref 21–32)
CREAT SERPL-MCNC: 1.19 MG/DL (ref 0.7–1.3)
DIFFERENTIAL METHOD BLD: NORMAL
EOSINOPHIL # BLD: 0.2 K/UL (ref 0–0.4)
EOSINOPHIL NFR BLD: 3 % (ref 0–7)
ERYTHROCYTE [DISTWIDTH] IN BLOOD BY AUTOMATED COUNT: 12.4 % (ref 11.5–14.5)
GLOBULIN SER CALC-MCNC: 3.7 G/DL (ref 2–4)
GLUCOSE SERPL-MCNC: 107 MG/DL (ref 65–100)
HCT VFR BLD AUTO: 44.6 % (ref 36.6–50.3)
HGB BLD-MCNC: 15.2 G/DL (ref 12.1–17)
IMM GRANULOCYTES # BLD AUTO: 0 K/UL (ref 0–0.04)
IMM GRANULOCYTES NFR BLD AUTO: 0 % (ref 0–0.5)
LYMPHOCYTES # BLD: 2.4 K/UL (ref 0.8–3.5)
LYMPHOCYTES NFR BLD: 41 % (ref 12–49)
MCH RBC QN AUTO: 29.6 PG (ref 26–34)
MCHC RBC AUTO-ENTMCNC: 34.1 G/DL (ref 30–36.5)
MCV RBC AUTO: 86.8 FL (ref 80–99)
MONOCYTES # BLD: 0.7 K/UL (ref 0–1)
MONOCYTES NFR BLD: 13 % (ref 5–13)
NEUTS SEG # BLD: 2.5 K/UL (ref 1.8–8)
NEUTS SEG NFR BLD: 42 % (ref 32–75)
NRBC # BLD: 0 K/UL (ref 0–0.01)
NRBC BLD-RTO: 0 PER 100 WBC
PLATELET # BLD AUTO: 199 K/UL (ref 150–400)
PMV BLD AUTO: 10.1 FL (ref 8.9–12.9)
POTASSIUM SERPL-SCNC: 3.8 MMOL/L (ref 3.5–5.1)
PROT SERPL-MCNC: 7.4 G/DL (ref 6.4–8.2)
RBC # BLD AUTO: 5.14 M/UL (ref 4.1–5.7)
SODIUM SERPL-SCNC: 139 MMOL/L (ref 136–145)
TROPONIN I SERPL HS-MCNC: 43 NG/L (ref 0–76)
WBC # BLD AUTO: 5.8 K/UL (ref 4.1–11.1)

## 2023-02-23 PROCEDURE — 74011250637 HC RX REV CODE- 250/637: Performed by: EMERGENCY MEDICINE

## 2023-02-23 PROCEDURE — 85025 COMPLETE CBC W/AUTO DIFF WBC: CPT

## 2023-02-23 PROCEDURE — 99285 EMERGENCY DEPT VISIT HI MDM: CPT

## 2023-02-23 PROCEDURE — 74011000250 HC RX REV CODE- 250: Performed by: EMERGENCY MEDICINE

## 2023-02-23 PROCEDURE — 83880 ASSAY OF NATRIURETIC PEPTIDE: CPT

## 2023-02-23 PROCEDURE — 36415 COLL VENOUS BLD VENIPUNCTURE: CPT

## 2023-02-23 PROCEDURE — 84484 ASSAY OF TROPONIN QUANT: CPT

## 2023-02-23 PROCEDURE — 93005 ELECTROCARDIOGRAM TRACING: CPT

## 2023-02-23 PROCEDURE — 71045 X-RAY EXAM CHEST 1 VIEW: CPT

## 2023-02-23 PROCEDURE — 80053 COMPREHEN METABOLIC PANEL: CPT

## 2023-02-23 RX ORDER — FAMOTIDINE 20 MG/1
20 TABLET, FILM COATED ORAL 2 TIMES DAILY
Qty: 60 TABLET | Refills: 0 | Status: SHIPPED | OUTPATIENT
Start: 2023-02-23 | End: 2023-03-05

## 2023-02-23 RX ADMIN — LIDOCAINE HYDROCHLORIDE 40 ML: 20 SOLUTION ORAL; TOPICAL at 06:22

## 2023-02-23 NOTE — ED NOTES
Report given to BRAULIO Breaux. Nurse was informed of reason for arrival, vitals, labs, medications, orders, procedures, results, anything left pending and current plan of action. Questions were asked and received prior to departure from the patient.

## 2023-02-23 NOTE — ED NOTES
I have reviewed discharge instructions with the patient. The patient verbalized understanding. Current Discharge Medication List      START taking these medications    Details   famotidine (Pepcid) 20 mg tablet Take 1 Tablet by mouth two (2) times a day for 10 days.   Qty: 60 Tablet, Refills: 0  Start date: 2/23/2023, End date: 3/5/2023

## 2023-02-23 NOTE — ED PROVIDER NOTES
[facility] EMERGENCY DEPT  EMERGENCY DEPARTMENT ENCOUNTER       Pt Name: Jeet Olson  MRN: [id number]  [place] 1970  Date of evaluation: 2/23/2023  Provider: Lila Abraham DO   PCP: Sandi Gage MD  Note Started: 6:08 AM 2/23/23     CHIEF COMPLAINT       Chief Complaint   Patient presents with    Chest Pain (Angina)     Pt arrives ambulatory to triage for substernal non-radiating chest pain that began x2 days ago. Denies any home analgesics nor cardiac hx        HISTORY OF PRESENT ILLNESS: 1 or more elements      History From: patient, History limited by: none     Jeet Olson is a 47 y.o. male presents to the emergency department by private vehicle for evaluation of chest pain. Patient states that had some chest discomfort substernally began 3 days ago. He states that its been constant for the last 3 days with no alleviating exacerbating factors. He states the pain is mild in severity. He denies any diaphoresis or shortness of breath with it. He denies any nausea, vomiting or diarrhea. States that occasionally he does have a little bit of swelling in the ankles however it typically goes away after laying down. He denies any headache, loss of sensation or strength. Patient does work as an . Patient states he does work out actively as well states that he has had other chest discomfort in the past that he associated with musculoskeletal pain but those were typically in the left chest.  He has no prior cardiac history. Please See MDM for Additional Details of the HPI/PMH  Nursing Notes were all reviewed and agreed with or any disagreements were addressed in the HPI. REVIEW OF SYSTEMS        Positives and Pertinent negatives as per HPI.     PAST HISTORY     Past Medical History:  Past Medical History:   Diagnosis Date    Hypertension     Pneumonia 12/2018    Pre-diabetes     Sleep apnea     no cpap       Past Surgical History:  Past Surgical History:   Procedure Laterality Date COLONOSCOPY N/A 11/1/2022    COLONOSCOPY performed by Jones Menendez MD at [facility] ENDOSCOPY       Family History:  Family History   Problem Relation Age of Onset    Heart Attack Mother         in her 46s    High Cholesterol Mother     Hypertension Mother     Heart Disease Mother         hx bypass    Diabetes Mother     Cancer Father         lymphoma    No Known Problems Sister     No Known Problems Brother     Cancer Maternal Grandmother         unknown cancer    No Known Problems Maternal Grandfather     Cancer Paternal Grandmother         unknown cancer    Cancer Paternal Grandfather         unknown cancer    Cancer Maternal Uncle         unknown cancer    Prostate Cancer Neg Hx     Colon Cancer Neg Hx        Social History:  Social History     Tobacco Use    Smoking status: Never    Smokeless tobacco: Never   Vaping Use    Vaping Use: Never used   Substance Use Topics    Alcohol use: No    Drug use: No       Allergies:  No Known Allergies    CURRENT MEDICATIONS      Previous Medications    CREATINE MONOHYDRATE (CREATINE PO)    Take 5 g by mouth. Indications: powder    IBUPROFEN (MOTRIN) 200 MG TABLET    Take 600 mg by mouth as needed for Pain. OTHER    Indications: prework out powder    PROTEIN SUPPLEMENT (PROTEIN PO)    Take  by mouth. Indications: powder       SCREENINGS               No data recorded         PHYSICAL EXAM      ED Triage Vitals [02/23/23 0521]   ED Encounter Vitals Group      BP (!) 161/95      Pulse (Heart Rate) 66      Resp Rate 18      Temp 97.5 °F (36.4 °C)      Temp src       O2 Sat (%) 98 %      Weight 243 lb 13.3 oz      Height 5' 5\"        Physical Exam  Vitals and nursing note reviewed. Constitutional:       General: He is not in acute distress. Appearance: He is well-developed. He is obese. He is not ill-appearing, toxic-appearing or diaphoretic. HENT:      Head: Normocephalic and atraumatic.       Mouth/Throat:      Mouth: Mucous membranes are moist.      Pharynx: No oropharyngeal exudate. Eyes:      Extraocular Movements: Extraocular movements intact. Conjunctiva/sclera: Conjunctivae normal.      Pupils: Pupils are equal, round, and reactive to light. Neck:      Vascular: No JVD. Trachea: No tracheal deviation. Cardiovascular:      Rate and Rhythm: Normal rate and regular rhythm. Heart sounds: Normal heart sounds. No murmur heard. Pulmonary:      Effort: Pulmonary effort is normal. No respiratory distress. Breath sounds: Normal breath sounds. No stridor. No wheezing or rales. Chest:      Chest wall: No tenderness. Abdominal:      General: There is no distension. Palpations: Abdomen is soft. Tenderness: There is no abdominal tenderness. There is no guarding or rebound. Musculoskeletal:         General: Normal range of motion. Cervical back: Normal range of motion and neck supple. Right lower leg: No edema. Left lower leg: No edema. Skin:     General: Skin is warm and dry. Capillary Refill: Capillary refill takes less than 2 seconds. Neurological:      Mental Status: He is alert and oriented to person, place, and time. Cranial Nerves: No cranial nerve deficit. Comments: No gross motor or sensory deficits    Psychiatric:         Mood and Affect: Mood normal.         Behavior: Behavior normal.        DIAGNOSTIC RESULTS   LABS:     Recent Results (from the past 12 hour(s))   EKG, 12 LEAD, INITIAL    Collection Time: 02/23/23  5:24 AM   Result Value Ref Range    Ventricular Rate 68 BPM    Atrial Rate 68 BPM    P-R Interval 144 ms    QRS Duration 86 ms    Q-T Interval 422 ms    QTC Calculation (Bezet) 448 ms    Calculated P Axis 41 degrees    Calculated R Axis 52 degrees    Calculated T Axis 39 degrees    Diagnosis       Normal sinus rhythm  Nonspecific ST abnormality  When compared with ECG of 20-DEC-2018 20:38,  Vent.  rate has decreased BY  43 BPM     CBC WITH AUTOMATED DIFF    Collection Time: 02/23/23 5: 37 AM   Result Value Ref Range    WBC 5.8 4.1 - 11.1 K/uL    RBC 5.14 4.10 - 5.70 M/uL    HGB 15.2 12.1 - 17.0 g/dL    HCT 44.6 36.6 - 50.3 %    MCV 86.8 80.0 - 99.0 FL    MCH 29.6 26.0 - 34.0 PG    MCHC 34.1 30.0 - 36.5 g/dL    RDW 12.4 11.5 - 14.5 %    PLATELET 819 843 - 311 K/uL    MPV 10.1 8.9 - 12.9 FL    NRBC 0.0 0  WBC    ABSOLUTE NRBC 0.00 0.00 - 0.01 K/uL    NEUTROPHILS 42 32 - 75 %    LYMPHOCYTES 41 12 - 49 %    MONOCYTES 13 5 - 13 %    EOSINOPHILS 3 0 - 7 %    BASOPHILS 1 0 - 1 %    IMMATURE GRANULOCYTES 0 0.0 - 0.5 %    ABS. NEUTROPHILS 2.5 1.8 - 8.0 K/UL    ABS. LYMPHOCYTES 2.4 0.8 - 3.5 K/UL    ABS. MONOCYTES 0.7 0.0 - 1.0 K/UL    ABS. EOSINOPHILS 0.2 0.0 - 0.4 K/UL    ABS. BASOPHILS 0.0 0.0 - 0.1 K/UL    ABS. IMM. GRANS. 0.0 0.00 - 0.04 K/UL    DF AUTOMATED     METABOLIC PANEL, COMPREHENSIVE    Collection Time: 02/23/23  5:37 AM   Result Value Ref Range    Sodium 139 136 - 145 mmol/L    Potassium 3.8 3.5 - 5.1 mmol/L    Chloride 106 97 - 108 mmol/L    CO2 29 21 - 32 mmol/L    Anion gap 4 (L) 5 - 15 mmol/L    Glucose 107 (H) 65 - 100 mg/dL    BUN 15 6 - 20 MG/DL    Creatinine 1.19 0.70 - 1.30 MG/DL    BUN/Creatinine ratio 13 12 - 20      eGFR >60 >60 ml/min/1.73m2    Calcium 9.0 8.5 - 10.1 MG/DL    Bilirubin, total 0.5 0.2 - 1.0 MG/DL    ALT (SGPT) 39 12 - 78 U/L    AST (SGOT) 23 15 - 37 U/L    Alk. phosphatase 110 45 - 117 U/L    Protein, total 7.4 6.4 - 8.2 g/dL    Albumin 3.7 3.5 - 5.0 g/dL    Globulin 3.7 2.0 - 4.0 g/dL    A-G Ratio 1.0 (L) 1.1 - 2.2     NT-PRO BNP    Collection Time: 02/23/23  5:37 AM   Result Value Ref Range    NT pro-BNP 15 <125 PG/ML   TROPONIN-HIGH SENSITIVITY    Collection Time: 02/23/23  5:37 AM   Result Value Ref Range    Troponin-High Sensitivity 43 0 - 76 ng/L        EKG:  If performed, independent interpretation documented below in the MDM section     RADIOLOGY:  Non-plain film images such as CT, Ultrasound and MRI are read by the radiologist. Plain radiographic images are visualized and preliminarily interpreted by the ED Provider with the findings documented in the MDM section. Interpretation per the Radiologist below, if available at the time of this note:     XR CHEST PORT    Result Date: 2/23/2023  EXAM:  XR CHEST PORT INDICATION: Chest pain. COMPARISON: Chest x-ray 12/20/2018. TECHNIQUE: Sagittal portable chest AP view FINDINGS: Cardiac monitor leads are noted. The cardiac silhouette is within normal limits. The pulmonary vasculature is within normal limits. The lungs and pleural spaces are clear. The visualized bones and upper abdomen are age-appropriate. No acute process on portable chest.        PROCEDURES   Unless otherwise noted below, none  Procedures     CRITICAL CARE TIME   None    EMERGENCY DEPARTMENT COURSE and DIFFERENTIAL DIAGNOSIS/MDM   Vitals:    Vitals:    02/23/23 0521 02/23/23 0545   BP: (!) 161/95 (!) 131/98   Pulse: 66 60   Resp: 18 18   Temp: 97.5 °F (36.4 °C) 98.6 °F (37 °C)   SpO2: 98% 96%   Weight: 110.6 kg (243 lb 13.3 oz)    Height: 5' 5\" (1.651 m)         Patient was given the following medications:  Medications   maalox/viscous lidocaine (COV GI COCKTAIL) (has no administration in time range)       Medical Decision Making  Amount and/or Complexity of Data Reviewed  Labs: ordered. Radiology: ordered. ECG/medicine tests: ordered. Patient followed by his primary care physician with a history of hypertension obesity. No prior endoscopies has had a prior colonoscopy dated 11/1/2022       EKG obtained 5:24 AM interpreted by me is, normal sinus rhythm, rate 68, normal axis/MI/QRS, NSST. Given substernal chest pain will order CBC CMP and troponin. Patient has had no abdominal pain. Given the fact the patient has had pain for the past 3 days its been constant feels only necessary to obtain 1 troponin. We will give the patient a GI cocktail to see if this helps first.  Chest x-ray to rule out any lung pathology.     Patient did have relief with GI cocktail. Labs reassuring negative troponin as well as chest x-ray. Patient will be discharged home prescription be written for Pepcid. FINAL IMPRESSION     1. Chest pain, unspecified type          DISPOSITION/PLAN   [facility]  results have been reviewed with him. He has been counseled regarding his diagnosis, treatment, and plan. He verbally conveys understanding and agreement of the signs, symptoms, diagnosis, treatment and prognosis and additionally agrees to follow up as discussed. He also agrees with the care-plan and conveys that all of his questions have been answered. I have also provided discharge instructions for him that include: educational information regarding their diagnosis and treatment, and list of reasons why they would want to return to the ED prior to their follow-up appointment, should his condition change. CLINICAL IMPRESSION    Discharge Note: The patient is stable for discharge home. The signs, symptoms, diagnosis, and discharge instructions have been discussed, understanding conveyed, and agreed upon. The patient is to follow up as recommended or return to ER should their symptoms worsen. PATIENT REFERRED TO:  Follow-up Information    None           DISCHARGE MEDICATIONS:  Current Discharge Medication List        START taking these medications    Details   famotidine (Pepcid) 20 mg tablet Take 1 Tablet by mouth two (2) times a day for 10 days. Qty: 60 Tablet, Refills: 0  Start date: 2/23/2023, End date: 3/5/2023               DISCONTINUED MEDICATIONS:  Current Discharge Medication List          I am the Primary Clinician of Record. Fransico Viveros DO (electronically signed)    (Please note that parts of this dictation were completed with voice recognition software. Quite often unanticipated grammatical, syntax, homophones, and other interpretive errors are inadvertently transcribed by the computer software. Please disregards these errors.  Please excuse any errors that have escaped final proofreading.)

## 2023-02-24 LAB
ATRIAL RATE: 68 BPM
CALCULATED P AXIS, ECG09: 41 DEGREES
CALCULATED R AXIS, ECG10: 52 DEGREES
CALCULATED T AXIS, ECG11: 39 DEGREES
DIAGNOSIS, 93000: NORMAL
P-R INTERVAL, ECG05: 144 MS
Q-T INTERVAL, ECG07: 422 MS
QRS DURATION, ECG06: 86 MS
QTC CALCULATION (BEZET), ECG08: 448 MS
VENTRICULAR RATE, ECG03: 68 BPM

## 2023-08-13 ENCOUNTER — HOSPITAL ENCOUNTER (EMERGENCY)
Facility: HOSPITAL | Age: 53
Discharge: LEFT BEFORE DISCHARGE | End: 2023-08-13

## 2023-08-13 VITALS
WEIGHT: 243.17 LBS | BODY MASS INDEX: 40.47 KG/M2 | TEMPERATURE: 98.6 F | RESPIRATION RATE: 14 BRPM | DIASTOLIC BLOOD PRESSURE: 95 MMHG | HEART RATE: 87 BPM | OXYGEN SATURATION: 96 % | SYSTOLIC BLOOD PRESSURE: 160 MMHG

## 2023-08-13 ASSESSMENT — PAIN SCALES - GENERAL: PAINLEVEL_OUTOF10: 3

## 2023-08-29 SDOH — ECONOMIC STABILITY: FOOD INSECURITY: WITHIN THE PAST 12 MONTHS, YOU WORRIED THAT YOUR FOOD WOULD RUN OUT BEFORE YOU GOT MONEY TO BUY MORE.: PATIENT DECLINED

## 2023-08-29 SDOH — ECONOMIC STABILITY: HOUSING INSECURITY
IN THE LAST 12 MONTHS, WAS THERE A TIME WHEN YOU DID NOT HAVE A STEADY PLACE TO SLEEP OR SLEPT IN A SHELTER (INCLUDING NOW)?: PATIENT REFUSED

## 2023-08-29 SDOH — ECONOMIC STABILITY: FOOD INSECURITY: WITHIN THE PAST 12 MONTHS, THE FOOD YOU BOUGHT JUST DIDN'T LAST AND YOU DIDN'T HAVE MONEY TO GET MORE.: PATIENT DECLINED

## 2023-08-29 SDOH — ECONOMIC STABILITY: TRANSPORTATION INSECURITY
IN THE PAST 12 MONTHS, HAS LACK OF TRANSPORTATION KEPT YOU FROM MEETINGS, WORK, OR FROM GETTING THINGS NEEDED FOR DAILY LIVING?: PATIENT DECLINED

## 2023-08-29 SDOH — ECONOMIC STABILITY: INCOME INSECURITY: HOW HARD IS IT FOR YOU TO PAY FOR THE VERY BASICS LIKE FOOD, HOUSING, MEDICAL CARE, AND HEATING?: PATIENT DECLINED

## 2023-09-01 ENCOUNTER — OFFICE VISIT (OUTPATIENT)
Age: 53
End: 2023-09-01
Payer: MEDICAID

## 2023-09-01 VITALS
HEIGHT: 65 IN | BODY MASS INDEX: 39.79 KG/M2 | RESPIRATION RATE: 18 BRPM | HEART RATE: 67 BPM | DIASTOLIC BLOOD PRESSURE: 77 MMHG | WEIGHT: 238.8 LBS | OXYGEN SATURATION: 97 % | SYSTOLIC BLOOD PRESSURE: 122 MMHG

## 2023-09-01 DIAGNOSIS — H91.92 DECREASED HEARING OF LEFT EAR: ICD-10-CM

## 2023-09-01 DIAGNOSIS — G44.86 CERVICOGENIC HEADACHE: ICD-10-CM

## 2023-09-01 DIAGNOSIS — R79.89 LOW TESTOSTERONE: ICD-10-CM

## 2023-09-01 DIAGNOSIS — E78.00 HIGH CHOLESTEROL: ICD-10-CM

## 2023-09-01 DIAGNOSIS — R73.03 PRE-DIABETES: ICD-10-CM

## 2023-09-01 DIAGNOSIS — N52.1 ERECTILE DYSFUNCTION DUE TO DISEASES CLASSIFIED ELSEWHERE: ICD-10-CM

## 2023-09-01 DIAGNOSIS — E66.01 MORBID (SEVERE) OBESITY DUE TO EXCESS CALORIES (HCC): Primary | ICD-10-CM

## 2023-09-01 PROCEDURE — 99214 OFFICE O/P EST MOD 30 MIN: CPT | Performed by: INTERNAL MEDICINE

## 2023-09-01 ASSESSMENT — PATIENT HEALTH QUESTIONNAIRE - PHQ9
1. LITTLE INTEREST OR PLEASURE IN DOING THINGS: 3
SUM OF ALL RESPONSES TO PHQ QUESTIONS 1-9: 8
5. POOR APPETITE OR OVEREATING: 0
10. IF YOU CHECKED OFF ANY PROBLEMS, HOW DIFFICULT HAVE THESE PROBLEMS MADE IT FOR YOU TO DO YOUR WORK, TAKE CARE OF THINGS AT HOME, OR GET ALONG WITH OTHER PEOPLE: 1
SUM OF ALL RESPONSES TO PHQ QUESTIONS 1-9: 8
SUM OF ALL RESPONSES TO PHQ9 QUESTIONS 1 & 2: 6
SUM OF ALL RESPONSES TO PHQ QUESTIONS 1-9: 8
6. FEELING BAD ABOUT YOURSELF - OR THAT YOU ARE A FAILURE OR HAVE LET YOURSELF OR YOUR FAMILY DOWN: 0
4. FEELING TIRED OR HAVING LITTLE ENERGY: 1
7. TROUBLE CONCENTRATING ON THINGS, SUCH AS READING THE NEWSPAPER OR WATCHING TELEVISION: 1
9. THOUGHTS THAT YOU WOULD BE BETTER OFF DEAD, OR OF HURTING YOURSELF: 0
3. TROUBLE FALLING OR STAYING ASLEEP: 0
8. MOVING OR SPEAKING SO SLOWLY THAT OTHER PEOPLE COULD HAVE NOTICED. OR THE OPPOSITE, BEING SO FIGETY OR RESTLESS THAT YOU HAVE BEEN MOVING AROUND A LOT MORE THAN USUAL: 0
2. FEELING DOWN, DEPRESSED OR HOPELESS: 3
SUM OF ALL RESPONSES TO PHQ QUESTIONS 1-9: 8

## 2023-09-01 NOTE — PATIENT INSTRUCTIONS
Labs at 8 AM  And MRI of brain ordered since there is decreased hearing   Schedule appointment with ENT for hearing check

## 2023-09-08 DIAGNOSIS — E78.00 HIGH CHOLESTEROL: ICD-10-CM

## 2023-09-08 DIAGNOSIS — R79.89 LOW TESTOSTERONE: ICD-10-CM

## 2023-09-08 DIAGNOSIS — R73.03 PRE-DIABETES: ICD-10-CM

## 2023-09-08 DIAGNOSIS — N52.1 ERECTILE DYSFUNCTION DUE TO DISEASES CLASSIFIED ELSEWHERE: ICD-10-CM

## 2023-09-09 LAB
ALBUMIN SERPL-MCNC: 4.1 G/DL (ref 3.5–5)
ALBUMIN/GLOB SERPL: 1.4 (ref 1.1–2.2)
ALP SERPL-CCNC: 106 U/L (ref 45–117)
ALT SERPL-CCNC: 40 U/L (ref 12–78)
ANION GAP SERPL CALC-SCNC: 4 MMOL/L (ref 5–15)
AST SERPL-CCNC: 32 U/L (ref 15–37)
BASOPHILS # BLD: 0 K/UL (ref 0–0.1)
BASOPHILS NFR BLD: 1 % (ref 0–1)
BILIRUB SERPL-MCNC: 0.6 MG/DL (ref 0.2–1)
BUN SERPL-MCNC: 15 MG/DL (ref 6–20)
BUN/CREAT SERPL: 12 (ref 12–20)
CALCIUM SERPL-MCNC: 9.1 MG/DL (ref 8.5–10.1)
CHLORIDE SERPL-SCNC: 107 MMOL/L (ref 97–108)
CHOLEST SERPL-MCNC: 181 MG/DL
CO2 SERPL-SCNC: 29 MMOL/L (ref 21–32)
CREAT SERPL-MCNC: 1.26 MG/DL (ref 0.7–1.3)
DIFFERENTIAL METHOD BLD: NORMAL
EOSINOPHIL # BLD: 0.1 K/UL (ref 0–0.4)
EOSINOPHIL NFR BLD: 2 % (ref 0–7)
ERYTHROCYTE [DISTWIDTH] IN BLOOD BY AUTOMATED COUNT: 12.6 % (ref 11.5–14.5)
EST. AVERAGE GLUCOSE BLD GHB EST-MCNC: 126 MG/DL
GLOBULIN SER CALC-MCNC: 3 G/DL (ref 2–4)
GLUCOSE SERPL-MCNC: 103 MG/DL (ref 65–100)
HBA1C MFR BLD: 6 % (ref 4–5.6)
HCT VFR BLD AUTO: 45 % (ref 36.6–50.3)
HDLC SERPL-MCNC: 74 MG/DL
HDLC SERPL: 2.4 (ref 0–5)
HGB BLD-MCNC: 14.8 G/DL (ref 12.1–17)
IMM GRANULOCYTES # BLD AUTO: 0 K/UL (ref 0–0.04)
IMM GRANULOCYTES NFR BLD AUTO: 0 % (ref 0–0.5)
LDLC SERPL CALC-MCNC: 89 MG/DL (ref 0–100)
LYMPHOCYTES # BLD: 2.1 K/UL (ref 0.8–3.5)
LYMPHOCYTES NFR BLD: 37 % (ref 12–49)
MCH RBC QN AUTO: 29.2 PG (ref 26–34)
MCHC RBC AUTO-ENTMCNC: 32.9 G/DL (ref 30–36.5)
MCV RBC AUTO: 88.9 FL (ref 80–99)
MONOCYTES # BLD: 0.7 K/UL (ref 0–1)
MONOCYTES NFR BLD: 12 % (ref 5–13)
NEUTS SEG # BLD: 2.8 K/UL (ref 1.8–8)
NEUTS SEG NFR BLD: 48 % (ref 32–75)
NRBC # BLD: 0 K/UL (ref 0–0.01)
NRBC BLD-RTO: 0 PER 100 WBC
PLATELET # BLD AUTO: 210 K/UL (ref 150–400)
PMV BLD AUTO: 10.8 FL (ref 8.9–12.9)
POTASSIUM SERPL-SCNC: 4.6 MMOL/L (ref 3.5–5.1)
PROT SERPL-MCNC: 7.1 G/DL (ref 6.4–8.2)
RBC # BLD AUTO: 5.06 M/UL (ref 4.1–5.7)
SODIUM SERPL-SCNC: 140 MMOL/L (ref 136–145)
TRIGL SERPL-MCNC: 90 MG/DL
VLDLC SERPL CALC-MCNC: 18 MG/DL
WBC # BLD AUTO: 5.7 K/UL (ref 4.1–11.1)

## 2023-09-10 LAB
PSA FREE MFR SERPL: 18 %
PSA FREE SERPL-MCNC: 0.09 NG/ML
PSA SERPL-MCNC: 0.5 NG/ML (ref 0–4)

## 2023-09-15 LAB
TESTOST FREE SERPL-MCNC: 11 PG/ML (ref 7.2–24)
TESTOST SERPL-MCNC: 178 NG/DL (ref 264–916)

## 2023-09-22 ENCOUNTER — HOSPITAL ENCOUNTER (OUTPATIENT)
Facility: HOSPITAL | Age: 53
End: 2023-09-22
Attending: INTERNAL MEDICINE
Payer: MEDICAID

## 2023-09-22 DIAGNOSIS — G44.86 CERVICOGENIC HEADACHE: ICD-10-CM

## 2023-09-22 DIAGNOSIS — H91.92 DECREASED HEARING OF LEFT EAR: ICD-10-CM

## 2023-09-22 PROCEDURE — 70553 MRI BRAIN STEM W/O & W/DYE: CPT

## 2023-09-22 PROCEDURE — A9579 GAD-BASE MR CONTRAST NOS,1ML: HCPCS | Performed by: INTERNAL MEDICINE

## 2023-09-22 PROCEDURE — 6360000004 HC RX CONTRAST MEDICATION: Performed by: INTERNAL MEDICINE

## 2023-09-22 RX ADMIN — GADOTERIDOL 20 ML: 279.3 INJECTION, SOLUTION INTRAVENOUS at 13:25

## 2024-02-02 ENCOUNTER — TELEMEDICINE (OUTPATIENT)
Age: 54
End: 2024-02-02
Payer: MEDICAID

## 2024-02-02 DIAGNOSIS — R10.30 LOWER ABDOMINAL PAIN: Primary | ICD-10-CM

## 2024-02-02 PROCEDURE — 99213 OFFICE O/P EST LOW 20 MIN: CPT | Performed by: INTERNAL MEDICINE

## 2024-02-02 ASSESSMENT — PATIENT HEALTH QUESTIONNAIRE - PHQ9
SUM OF ALL RESPONSES TO PHQ QUESTIONS 1-9: 0
SUM OF ALL RESPONSES TO PHQ QUESTIONS 1-9: 0
1. LITTLE INTEREST OR PLEASURE IN DOING THINGS: 0
SUM OF ALL RESPONSES TO PHQ QUESTIONS 1-9: 0
2. FEELING DOWN, DEPRESSED OR HOPELESS: 0
SUM OF ALL RESPONSES TO PHQ9 QUESTIONS 1 & 2: 0
SUM OF ALL RESPONSES TO PHQ QUESTIONS 1-9: 0

## 2024-02-02 NOTE — PROGRESS NOTES
Jeremy Castle is a 53 y.o. male who was seen by synchronous (real-time) audio-video technology on 2024 for Abdominal Pain (X 1 week /Patient stated that the pain started around an 8, but has since calmed to about 5. He also stated that pain \"comes and goes\". )        Progress Note       PROGRESS NOTE  Name: Jeremy Castle   : 1970       ASSESSMENT/ PLAN:     Jeremy was seen today for abdominal pain.    Diagnoses and all orders for this visit:    Lower abdominal pain  -     CT ABDOMEN PELVIS W WO CONTRAST Additional Contrast? Radiologist Recommendation; Future  -     Urinalysis with Microscopic; Future  -     Comprehensive Metabolic Panel; Future  -     CBC with Auto Differential; Future    Return if symptoms worsen or fail to improve.     I have reviewed the patient's medications and risks/side effects/benefits were discussed. Diagnosis(-es) explained to patient and questions answered. Literature provided where appropriate.                       SUBJECTIVE  Mr. Jeremy Castle is a patient of Charisse Malone MD and presents today acutely for     Chief Complaint   Patient presents with    Abdominal Pain     X 1 week   Patient stated that the pain started around an 8, but has since calmed to about 5. He also stated that pain \"comes and goes\".        Abdominal pain started about 6 days ago, pressure, located under umbilicus.     He started drinking water, and wondered if it was a kidney stone. This hasn't helped. He has no urinary symptoms.     He gets the pressure as he is defecating. He also gets the pressure with eating. He is having normal, formed stool. He has a BM daily. No blood.     Lower abdomen is sore with palpation.     OBJECTIVE  There were no vitals taken for this visit.  Gen: Pleasant 53 y.o.  male in NAD.             Objective:          No data to display                 [INSTRUCTIONS:  \"[x]\" Indicates a positive item  \"[]\" Indicates a negative item  -- DELETE ALL ITEMS NOT

## 2024-02-02 NOTE — PROGRESS NOTES
Chief Complaint   Patient presents with    Abdominal Pain     X 1 week   Patient stated that the pain started around an 8, but has since calmed to about 5. He also stated that pain \"comes and goes\".        \"Have you been to the ER, urgent care clinic since your last visit?  Hospitalized since your last visit?\"    NO    “Have you seen or consulted any other health care providers outside of [facility] since your last visit?”    NO

## 2024-02-15 ENCOUNTER — HOSPITAL ENCOUNTER (OUTPATIENT)
Facility: HOSPITAL | Age: 54
Discharge: HOME | End: 2024-02-15
Attending: INTERNAL MEDICINE
Payer: MEDICAID

## 2024-02-15 DIAGNOSIS — R10.30 LOWER ABDOMINAL PAIN: ICD-10-CM

## 2024-02-15 PROCEDURE — 74177 CT ABD & PELVIS W/CONTRAST: CPT

## 2024-02-15 PROCEDURE — 6360000004 HC RX CONTRAST MEDICATION: Performed by: INTERNAL MEDICINE

## 2024-02-15 RX ADMIN — IOPAMIDOL 100 ML: 755 INJECTION, SOLUTION INTRAVENOUS at 16:52

## 2024-02-16 RX ORDER — METRONIDAZOLE 500 MG/1
500 TABLET ORAL 2 TIMES DAILY
Qty: 14 TABLET | Refills: 0 | Status: SHIPPED | OUTPATIENT
Start: 2024-02-16 | End: 2024-02-23

## 2024-02-16 RX ORDER — CIPROFLOXACIN 500 MG/1
500 TABLET, FILM COATED ORAL 2 TIMES DAILY
Qty: 14 TABLET | Refills: 0 | Status: SHIPPED | OUTPATIENT
Start: 2024-02-16 | End: 2024-02-23

## 2024-02-19 ENCOUNTER — NURSE ONLY (OUTPATIENT)
Age: 54
End: 2024-02-19

## 2024-02-19 ENCOUNTER — TELEPHONE (OUTPATIENT)
Age: 54
End: 2024-02-19

## 2024-02-19 DIAGNOSIS — R10.30 LOWER ABDOMINAL PAIN: ICD-10-CM

## 2024-02-19 LAB — SPECIMEN HOLD: NORMAL

## 2024-02-19 NOTE — TELEPHONE ENCOUNTER
Patient would like to switch providers from Dr Byrd to Dr Chacko - Ended up going to [facility] for an acute apt and decided would like as a primary care doctor.  Prefers to have a male doctor. Is this switch ok

## 2024-02-20 LAB
ALBUMIN SERPL-MCNC: 3.9 G/DL (ref 3.5–5)
ALBUMIN/GLOB SERPL: 1.2 (ref 1.1–2.2)
ALP SERPL-CCNC: 116 U/L (ref 45–117)
ALT SERPL-CCNC: 40 U/L (ref 12–78)
ANION GAP SERPL CALC-SCNC: 4 MMOL/L (ref 5–15)
APPEARANCE UR: CLEAR
AST SERPL-CCNC: 35 U/L (ref 15–37)
BACTERIA URNS QL MICRO: NEGATIVE /HPF
BASOPHILS # BLD: 0.1 K/UL (ref 0–0.1)
BASOPHILS NFR BLD: 1 % (ref 0–1)
BILIRUB SERPL-MCNC: 0.6 MG/DL (ref 0.2–1)
BILIRUB UR QL: NEGATIVE
BUN SERPL-MCNC: 11 MG/DL (ref 6–20)
BUN/CREAT SERPL: 9 (ref 12–20)
CALCIUM SERPL-MCNC: 9.3 MG/DL (ref 8.5–10.1)
CHLORIDE SERPL-SCNC: 105 MMOL/L (ref 97–108)
CO2 SERPL-SCNC: 29 MMOL/L (ref 21–32)
COLOR UR: NORMAL
CREAT SERPL-MCNC: 1.25 MG/DL (ref 0.7–1.3)
DIFFERENTIAL METHOD BLD: ABNORMAL
EOSINOPHIL # BLD: 0.1 K/UL (ref 0–0.4)
EOSINOPHIL NFR BLD: 2 % (ref 0–7)
EPITH CASTS URNS QL MICRO: NORMAL /LPF
ERYTHROCYTE [DISTWIDTH] IN BLOOD BY AUTOMATED COUNT: 12.7 % (ref 11.5–14.5)
GLOBULIN SER CALC-MCNC: 3.3 G/DL (ref 2–4)
GLUCOSE SERPL-MCNC: 110 MG/DL (ref 65–100)
GLUCOSE UR STRIP.AUTO-MCNC: NEGATIVE MG/DL
HCT VFR BLD AUTO: 46.7 % (ref 36.6–50.3)
HGB BLD-MCNC: 15.7 G/DL (ref 12.1–17)
HGB UR QL STRIP: NEGATIVE
HYALINE CASTS URNS QL MICRO: NORMAL /LPF (ref 0–5)
IMM GRANULOCYTES # BLD AUTO: 0 K/UL (ref 0–0.04)
IMM GRANULOCYTES NFR BLD AUTO: 1 % (ref 0–0.5)
KETONES UR QL STRIP.AUTO: NEGATIVE MG/DL
LEUKOCYTE ESTERASE UR QL STRIP.AUTO: NEGATIVE
LYMPHOCYTES # BLD: 2.2 K/UL (ref 0.8–3.5)
LYMPHOCYTES NFR BLD: 35 % (ref 12–49)
MCH RBC QN AUTO: 29.5 PG (ref 26–34)
MCHC RBC AUTO-ENTMCNC: 33.6 G/DL (ref 30–36.5)
MCV RBC AUTO: 87.8 FL (ref 80–99)
MONOCYTES # BLD: 0.9 K/UL (ref 0–1)
MONOCYTES NFR BLD: 14 % (ref 5–13)
NEUTS SEG # BLD: 2.9 K/UL (ref 1.8–8)
NEUTS SEG NFR BLD: 47 % (ref 32–75)
NITRITE UR QL STRIP.AUTO: NEGATIVE
NRBC # BLD: 0 K/UL (ref 0–0.01)
NRBC BLD-RTO: 0 PER 100 WBC
PH UR STRIP: 6.5 (ref 5–8)
PLATELET # BLD AUTO: 227 K/UL (ref 150–400)
PMV BLD AUTO: 10.8 FL (ref 8.9–12.9)
POTASSIUM SERPL-SCNC: 4.5 MMOL/L (ref 3.5–5.1)
PROT SERPL-MCNC: 7.2 G/DL (ref 6.4–8.2)
PROT UR STRIP-MCNC: NEGATIVE MG/DL
RBC # BLD AUTO: 5.32 M/UL (ref 4.1–5.7)
RBC #/AREA URNS HPF: NORMAL /HPF (ref 0–5)
SODIUM SERPL-SCNC: 138 MMOL/L (ref 136–145)
SP GR UR REFRACTOMETRY: 1.01 (ref 1–1.03)
UROBILINOGEN UR QL STRIP.AUTO: 0.2 EU/DL (ref 0.2–1)
WBC # BLD AUTO: 6.1 K/UL (ref 4.1–11.1)
WBC URNS QL MICRO: NORMAL /HPF (ref 0–4)

## 2024-04-19 ENCOUNTER — OFFICE VISIT (OUTPATIENT)
Age: 54
End: 2024-04-19
Payer: MEDICAID

## 2024-04-19 VITALS
BODY MASS INDEX: 41.65 KG/M2 | TEMPERATURE: 98 F | WEIGHT: 250 LBS | SYSTOLIC BLOOD PRESSURE: 123 MMHG | DIASTOLIC BLOOD PRESSURE: 78 MMHG | HEART RATE: 67 BPM | OXYGEN SATURATION: 96 % | HEIGHT: 65 IN | RESPIRATION RATE: 18 BRPM

## 2024-04-19 DIAGNOSIS — S46.319A: ICD-10-CM

## 2024-04-19 DIAGNOSIS — I10 ESSENTIAL (PRIMARY) HYPERTENSION: Primary | ICD-10-CM

## 2024-04-19 DIAGNOSIS — R79.89 LOW TESTOSTERONE: ICD-10-CM

## 2024-04-19 PROCEDURE — 99214 OFFICE O/P EST MOD 30 MIN: CPT | Performed by: INTERNAL MEDICINE

## 2024-04-19 RX ORDER — TESTOSTERONE 16.2 MG/G
1 GEL TRANSDERMAL DAILY
Qty: 3 EACH | Refills: 0 | Status: SHIPPED | OUTPATIENT
Start: 2024-04-19 | End: 2024-10-16

## 2024-04-19 NOTE — PROGRESS NOTES
Chief Complaint   Patient presents with    Follow-up        /78 (Site: Left Upper Arm, Position: Sitting, Cuff Size: Large Adult)   Pulse 67   Temp 98 °F (36.7 °C) (Temporal)   Resp 18   Ht 1.651 m (5' 5\")   Wt 113.4 kg (250 lb)   SpO2 96%   BMI 41.60 kg/m²     Pain Scale: 0 - No pain/10  Pain Location:      Current Outpatient Medications on File Prior to Visit   Medication Sig Dispense Refill    Cholecalciferol (VITAMIN D3 PO) Take by mouth      ZINC PO Take by mouth      UNABLE TO FIND Protein Powder and Pre-workout       No current facility-administered medications on file prior to visit.       Health Maintenance Due   Topic    Hepatitis B vaccine (1 of 3 - 3-dose series)    HIV screen     DTaP/Tdap/Td vaccine (1 - Tdap)    Shingles vaccine (1 of 2)    COVID-19 Vaccine (3 - 2023-24 season)       1. \"Have you been to the ER, urgent care clinic since your last visit?  Hospitalized since your last visit?\" no    2. \"Have you seen or consulted any other health care providers outside of the [facility] since your last visit?\" no     3. For patients aged 45-75: Has the patient had a colonoscopy / FIT/ Cologuard? Yes

## 2024-04-19 NOTE — PROGRESS NOTES
PROGRESS NOTE  Name: Jeremy Castle   : 1970       ASSESSMENT/ PLAN:     Jeremy was seen today for follow-up.    Diagnoses and all orders for this visit:    Essential (primary) hypertension: BP is fine today. Continue lifestyle measures.     Low testosterone: Symptomatic, with fatigue, low libido.   -     Testosterone (ANDROGEL) 20.25 MG/ACT (1.62%) GEL gel; Place 1 actuation onto the skin daily for 180 days. Max Daily Amount: 1,250 mg    Tendon injury, distal triceps near insertion: The main treatment for tendon injuries is rest. Also OTC analgesics.     Return in about 4 months (around 2024) for CPE.     I have reviewed the patient's medications and risks/side effects/benefits were discussed. Diagnosis(-es) explained to patient and questions answered. Literature provided where appropriate.                    SUBJECTIVE:   Mr. Jeremy Castle is a 53 y.o. male who presents today for follow up.      Chief Complaint   Patient presents with    Follow-up     He has been having some sharp pains both distal humerus/elbows, limiting his work out. \"It's like electricity\". Onset was while doing weight lifting.     Testosterone is low. He has issues with libido, fatigue.     His abdominal pain has resolved.     At this time, he is otherwise doing well and has brought no other complaints to my attention today.  For a list of the medical issues addressed today, see the assessment and plan above.    PMH:   Past Medical History:   Diagnosis Date    Hypertension     Pneumonia 2018    Pre-diabetes     Sleep apnea     no cpap       Past Surgical History:   Procedure Laterality Date    COLONOSCOPY N/A 2022    COLONOSCOPY performed by Jeramy Pop MD at [facility] ENDOSCOPY    COLONOSCOPY         All: Patient has no known allergies.     Current Outpatient Medications   Medication Sig    Cholecalciferol (VITAMIN D3 PO) Take by mouth    ZINC PO Take by mouth    UNABLE TO FIND Protein Powder and Pre-workout     No

## 2024-08-23 ENCOUNTER — OFFICE VISIT (OUTPATIENT)
Age: 54
End: 2024-08-23
Payer: MEDICAID

## 2024-08-23 VITALS
BODY MASS INDEX: 43.15 KG/M2 | TEMPERATURE: 98.2 F | OXYGEN SATURATION: 96 % | WEIGHT: 259 LBS | HEIGHT: 65 IN | SYSTOLIC BLOOD PRESSURE: 128 MMHG | HEART RATE: 71 BPM | RESPIRATION RATE: 18 BRPM | DIASTOLIC BLOOD PRESSURE: 81 MMHG

## 2024-08-23 DIAGNOSIS — R79.89 LOW TESTOSTERONE: ICD-10-CM

## 2024-08-23 DIAGNOSIS — Z00.00 ENCOUNTER FOR WELL ADULT EXAM WITHOUT ABNORMAL FINDINGS: ICD-10-CM

## 2024-08-23 DIAGNOSIS — Z00.00 ROUTINE GENERAL MEDICAL EXAMINATION AT A HEALTH CARE FACILITY: Primary | ICD-10-CM

## 2024-08-23 PROCEDURE — 99396 PREV VISIT EST AGE 40-64: CPT | Performed by: INTERNAL MEDICINE

## 2024-08-23 RX ORDER — TESTOSTERONE 16.2 MG/G
1 GEL TRANSDERMAL DAILY
Qty: 3 EACH | Refills: 0 | Status: SHIPPED | OUTPATIENT
Start: 2024-08-23 | End: 2025-02-19

## 2024-08-23 ASSESSMENT — PATIENT HEALTH QUESTIONNAIRE - PHQ9
SUM OF ALL RESPONSES TO PHQ QUESTIONS 1-9: 2
SUM OF ALL RESPONSES TO PHQ9 QUESTIONS 1 & 2: 2
SUM OF ALL RESPONSES TO PHQ QUESTIONS 1-9: 2
1. LITTLE INTEREST OR PLEASURE IN DOING THINGS: SEVERAL DAYS
2. FEELING DOWN, DEPRESSED OR HOPELESS: SEVERAL DAYS

## 2024-08-23 NOTE — PROGRESS NOTES
\"Have you been to the ER, urgent care clinic since your last visit?  Hospitalized since your last visit?\"    NO    “Have you seen or consulted any other health care providers outside of [facility] since your last visit?”    NO            Click Here for Release of Records Request  
      All: Patient has no known allergies.     Current Outpatient Medications   Medication Sig    Testosterone (ANDROGEL) 20.25 MG/ACT (1.62%) GEL gel Place 1 actuation onto the skin daily for 180 days. Max Daily Amount: 1,250 mg    Cholecalciferol (VITAMIN D3 PO) Take by mouth    ZINC PO Take by mouth    UNABLE TO FIND Protein Powder and Pre-workout     No current facility-administered medications for this visit.         FH: family history includes Cancer in his father, maternal grandmother, maternal uncle, paternal grandfather, and paternal grandmother; Diabetes in his mother; Heart Attack in his mother; Heart Disease in his mother; High Cholesterol in his mother; Hypertension in his mother; No Known Problems in his brother, maternal grandfather, and sister.     SH:  reports that he has never smoked. He has never used smokeless tobacco. He reports that he does not drink alcohol and does not use drugs.     ROS: See above; Complete ROS otherwise negative.     OBJECTIVE:   Vitals: /81 (Site: Left Upper Arm, Position: Sitting, Cuff Size: Large Adult)   Pulse 71   Temp 98.2 °F (36.8 °C) (Temporal)   Resp 18   Ht 1.651 m (5' 5\")   Wt 117.5 kg (259 lb)   SpO2 96%   BMI 43.10 kg/m²    Gen: Pleasant 54 y.o.  male in NAD.  HEENT: PERRLA. EOMI. OP moist and pink.  Neck: Supple.  No LAD. HEART: RRR, No M/G/R.    LUNGS: CTAB No W/R.  ABDOMEN: S, NT, ND, BS+.   EXTREMITIES: Warm. No C/C/E.  MUSCULOSKELETAL: Normal ROM, muscle strength 5/5 all groups.  NEURO: Alert and oriented x 3.  Cranial nerves grossly intact.  No focal sensory or motor deficits noted. SKIN: Warm. Dry. No rashes or other lesions noted.    Lab Results   Component Value Date/Time     02/19/2024 08:14 AM    K 4.5 02/19/2024 08:14 AM     02/19/2024 08:14 AM    CO2 29 02/19/2024 08:14 AM    BUN 11 02/19/2024 08:14 AM    GFRAA >60 05/27/2022 08:13 AM    ALT 40 02/19/2024 08:14 AM    GLOB 3.3 02/19/2024 08:14 AM       Lab Results

## 2024-08-23 NOTE — PATIENT INSTRUCTIONS
hands, brush your teeth twice a day, and wear a seat belt in the car.   Where can you learn more?  Go to [email or web address] and enter P072 to learn more about \"Well Visit, Ages 18 to 65: Care Instructions.\"  Current as of: August 6, 2023  Content Version: 14.1  © 1148-0861 [employer].   Care instructions adapted under license by [employer]. If you have questions about a medical condition or this instruction, always ask your healthcare professional. [employer] disclaims any warranty or liability for your use of this information.      
stable

## 2024-08-30 DIAGNOSIS — R79.89 LOW TESTOSTERONE: ICD-10-CM

## 2024-08-30 DIAGNOSIS — Z00.00 ROUTINE GENERAL MEDICAL EXAMINATION AT A HEALTH CARE FACILITY: ICD-10-CM

## 2024-08-30 LAB
ALBUMIN SERPL-MCNC: 3.7 G/DL (ref 3.5–5)
ALBUMIN/GLOB SERPL: 1.2 (ref 1.1–2.2)
ALP SERPL-CCNC: 125 U/L (ref 45–117)
ALT SERPL-CCNC: 28 U/L (ref 12–78)
ANION GAP SERPL CALC-SCNC: 7 MMOL/L (ref 5–15)
AST SERPL-CCNC: 23 U/L (ref 15–37)
BASOPHILS # BLD: 0 K/UL (ref 0–0.1)
BASOPHILS NFR BLD: 0 % (ref 0–1)
BILIRUB SERPL-MCNC: 0.4 MG/DL (ref 0.2–1)
BUN SERPL-MCNC: 10 MG/DL (ref 6–20)
BUN/CREAT SERPL: 9 (ref 12–20)
CALCIUM SERPL-MCNC: 9.1 MG/DL (ref 8.5–10.1)
CHLORIDE SERPL-SCNC: 107 MMOL/L (ref 97–108)
CHOLEST SERPL-MCNC: 174 MG/DL
CO2 SERPL-SCNC: 28 MMOL/L (ref 21–32)
CREAT SERPL-MCNC: 1.09 MG/DL (ref 0.7–1.3)
DIFFERENTIAL METHOD BLD: ABNORMAL
EOSINOPHIL # BLD: 0.1 K/UL (ref 0–0.4)
EOSINOPHIL NFR BLD: 2 % (ref 0–7)
ERYTHROCYTE [DISTWIDTH] IN BLOOD BY AUTOMATED COUNT: 13.1 % (ref 11.5–14.5)
EST. AVERAGE GLUCOSE BLD GHB EST-MCNC: 131 MG/DL
GLOBULIN SER CALC-MCNC: 3.1 G/DL (ref 2–4)
GLUCOSE SERPL-MCNC: 107 MG/DL (ref 65–100)
HBA1C MFR BLD: 6.2 % (ref 4–5.6)
HCT VFR BLD AUTO: 43.9 % (ref 36.6–50.3)
HDLC SERPL-MCNC: 62 MG/DL
HDLC SERPL: 2.8 (ref 0–5)
HGB BLD-MCNC: 14.5 G/DL (ref 12.1–17)
IMM GRANULOCYTES # BLD AUTO: 0 K/UL (ref 0–0.04)
IMM GRANULOCYTES NFR BLD AUTO: 1 % (ref 0–0.5)
LDLC SERPL CALC-MCNC: 91.6 MG/DL (ref 0–100)
LYMPHOCYTES # BLD: 2 K/UL (ref 0.8–3.5)
LYMPHOCYTES NFR BLD: 35 % (ref 12–49)
MCH RBC QN AUTO: 29.2 PG (ref 26–34)
MCHC RBC AUTO-ENTMCNC: 33 G/DL (ref 30–36.5)
MCV RBC AUTO: 88.5 FL (ref 80–99)
MONOCYTES # BLD: 0.7 K/UL (ref 0–1)
MONOCYTES NFR BLD: 13 % (ref 5–13)
NEUTS SEG # BLD: 2.9 K/UL (ref 1.8–8)
NEUTS SEG NFR BLD: 49 % (ref 32–75)
NRBC # BLD: 0 K/UL (ref 0–0.01)
NRBC BLD-RTO: 0 PER 100 WBC
PLATELET # BLD AUTO: 205 K/UL (ref 150–400)
PMV BLD AUTO: 11.4 FL (ref 8.9–12.9)
POTASSIUM SERPL-SCNC: 4.3 MMOL/L (ref 3.5–5.1)
PROT SERPL-MCNC: 6.8 G/DL (ref 6.4–8.2)
RBC # BLD AUTO: 4.96 M/UL (ref 4.1–5.7)
SODIUM SERPL-SCNC: 142 MMOL/L (ref 136–145)
TRIGL SERPL-MCNC: 102 MG/DL
TSH SERPL DL<=0.05 MIU/L-ACNC: 3.76 UIU/ML (ref 0.36–3.74)
VLDLC SERPL CALC-MCNC: 20.4 MG/DL
WBC # BLD AUTO: 5.7 K/UL (ref 4.1–11.1)

## 2024-08-31 LAB
PSA FREE MFR SERPL: 27.5 %
PSA FREE SERPL-MCNC: 0.11 NG/ML
PSA SERPL-MCNC: 0.4 NG/ML (ref 0–4)

## 2024-09-02 LAB
TESTOST FREE SERPL-MCNC: 13.1 PG/ML (ref 7.2–24)
TESTOST SERPL-MCNC: 180 NG/DL (ref 264–916)

## 2024-10-29 ENCOUNTER — HOSPITAL ENCOUNTER (EMERGENCY)
Facility: HOSPITAL | Age: 54
Discharge: HOME | End: 2024-10-29
Attending: EMERGENCY MEDICINE
Payer: MEDICAID

## 2024-10-29 ENCOUNTER — APPOINTMENT (OUTPATIENT)
Facility: HOSPITAL | Age: 54
End: 2024-10-29
Payer: MEDICAID

## 2024-10-29 VITALS
SYSTOLIC BLOOD PRESSURE: 153 MMHG | DIASTOLIC BLOOD PRESSURE: 94 MMHG | OXYGEN SATURATION: 96 % | HEART RATE: 92 BPM | TEMPERATURE: 98.4 F | RESPIRATION RATE: 24 BRPM | BODY MASS INDEX: 40.67 KG/M2 | HEIGHT: 66 IN | WEIGHT: 253.09 LBS

## 2024-10-29 DIAGNOSIS — G44.209 TENSION HEADACHE: ICD-10-CM

## 2024-10-29 DIAGNOSIS — R07.9 CHEST PAIN, UNSPECIFIED TYPE: ICD-10-CM

## 2024-10-29 DIAGNOSIS — U07.1 COVID-19 VIRUS INFECTION: Primary | ICD-10-CM

## 2024-10-29 LAB
ALBUMIN SERPL-MCNC: 3.7 G/DL (ref 3.5–5)
ALBUMIN/GLOB SERPL: 0.9 (ref 1.1–2.2)
ALP SERPL-CCNC: 93 U/L (ref 45–117)
ALT SERPL-CCNC: 32 U/L (ref 12–78)
ANION GAP SERPL CALC-SCNC: 4 MMOL/L (ref 2–12)
AST SERPL-CCNC: 25 U/L (ref 15–37)
BASOPHILS # BLD: 0 K/UL (ref 0–0.1)
BASOPHILS NFR BLD: 0 % (ref 0–1)
BILIRUB SERPL-MCNC: 0.7 MG/DL (ref 0.2–1)
BUN SERPL-MCNC: 12 MG/DL (ref 6–20)
BUN/CREAT SERPL: 10 (ref 12–20)
CALCIUM SERPL-MCNC: 9.1 MG/DL (ref 8.5–10.1)
CHLORIDE SERPL-SCNC: 105 MMOL/L (ref 97–108)
CO2 SERPL-SCNC: 28 MMOL/L (ref 21–32)
CREAT SERPL-MCNC: 1.16 MG/DL (ref 0.7–1.3)
DIFFERENTIAL METHOD BLD: ABNORMAL
EOSINOPHIL # BLD: 0.1 K/UL (ref 0–0.4)
EOSINOPHIL NFR BLD: 1 % (ref 0–7)
ERYTHROCYTE [DISTWIDTH] IN BLOOD BY AUTOMATED COUNT: 12.6 % (ref 11.5–14.5)
FLUAV RNA SPEC QL NAA+PROBE: NOT DETECTED
FLUBV RNA SPEC QL NAA+PROBE: NOT DETECTED
GLOBULIN SER CALC-MCNC: 3.9 G/DL (ref 2–4)
GLUCOSE SERPL-MCNC: 109 MG/DL (ref 65–100)
HCT VFR BLD AUTO: 44.4 % (ref 36.6–50.3)
HGB BLD-MCNC: 15.2 G/DL (ref 12.1–17)
IMM GRANULOCYTES # BLD AUTO: 0 K/UL (ref 0–0.04)
IMM GRANULOCYTES NFR BLD AUTO: 1 % (ref 0–0.5)
LYMPHOCYTES # BLD: 1.5 K/UL (ref 0.8–3.5)
LYMPHOCYTES NFR BLD: 20 % (ref 12–49)
MCH RBC QN AUTO: 29.3 PG (ref 26–34)
MCHC RBC AUTO-ENTMCNC: 34.2 G/DL (ref 30–36.5)
MCV RBC AUTO: 85.5 FL (ref 80–99)
MONOCYTES # BLD: 1.2 K/UL (ref 0–1)
MONOCYTES NFR BLD: 16 % (ref 5–13)
NEUTS SEG # BLD: 4.8 K/UL (ref 1.8–8)
NEUTS SEG NFR BLD: 62 % (ref 32–75)
NRBC # BLD: 0 K/UL (ref 0–0.01)
NRBC BLD-RTO: 0 PER 100 WBC
PLATELET # BLD AUTO: 184 K/UL (ref 150–400)
PMV BLD AUTO: 10.5 FL (ref 8.9–12.9)
POTASSIUM SERPL-SCNC: 3.7 MMOL/L (ref 3.5–5.1)
PROT SERPL-MCNC: 7.6 G/DL (ref 6.4–8.2)
RBC # BLD AUTO: 5.19 M/UL (ref 4.1–5.7)
SARS-COV-2 RNA RESP QL NAA+PROBE: DETECTED
SODIUM SERPL-SCNC: 137 MMOL/L (ref 136–145)
SOURCE: ABNORMAL
TROPONIN I SERPL HS-MCNC: 40 NG/L (ref 0–76)
WBC # BLD AUTO: 7.7 K/UL (ref 4.1–11.1)

## 2024-10-29 PROCEDURE — 87636 SARSCOV2 & INF A&B AMP PRB: CPT

## 2024-10-29 PROCEDURE — 80053 COMPREHEN METABOLIC PANEL: CPT

## 2024-10-29 PROCEDURE — 85025 COMPLETE CBC W/AUTO DIFF WBC: CPT

## 2024-10-29 PROCEDURE — 93005 ELECTROCARDIOGRAM TRACING: CPT | Performed by: EMERGENCY MEDICINE

## 2024-10-29 PROCEDURE — 84484 ASSAY OF TROPONIN QUANT: CPT

## 2024-10-29 PROCEDURE — 6360000002 HC RX W HCPCS: Performed by: EMERGENCY MEDICINE

## 2024-10-29 PROCEDURE — 36415 COLL VENOUS BLD VENIPUNCTURE: CPT

## 2024-10-29 PROCEDURE — 71046 X-RAY EXAM CHEST 2 VIEWS: CPT

## 2024-10-29 PROCEDURE — 96374 THER/PROPH/DIAG INJ IV PUSH: CPT

## 2024-10-29 PROCEDURE — 99285 EMERGENCY DEPT VISIT HI MDM: CPT

## 2024-10-29 RX ORDER — KETOROLAC TROMETHAMINE 30 MG/ML
30 INJECTION, SOLUTION INTRAMUSCULAR; INTRAVENOUS
Status: COMPLETED | OUTPATIENT
Start: 2024-10-29 | End: 2024-10-29

## 2024-10-29 RX ORDER — KETOROLAC TROMETHAMINE 10 MG/1
10 TABLET, FILM COATED ORAL EVERY 6 HOURS PRN
Qty: 10 TABLET | Refills: 0 | Status: SHIPPED | OUTPATIENT
Start: 2024-10-29

## 2024-10-29 RX ORDER — BUTALBITAL, ACETAMINOPHEN AND CAFFEINE 300; 40; 50 MG/1; MG/1; MG/1
1 CAPSULE ORAL EVERY 4 HOURS PRN
Qty: 20 CAPSULE | Refills: 0 | Status: SHIPPED | OUTPATIENT
Start: 2024-10-29

## 2024-10-29 RX ADMIN — KETOROLAC TROMETHAMINE 30 MG: 30 INJECTION, SOLUTION INTRAMUSCULAR at 13:19

## 2024-10-29 ASSESSMENT — PAIN SCALES - GENERAL: PAINLEVEL_OUTOF10: 5

## 2024-10-29 ASSESSMENT — LIFESTYLE VARIABLES
HOW OFTEN DO YOU HAVE A DRINK CONTAINING ALCOHOL: NEVER
HOW MANY STANDARD DRINKS CONTAINING ALCOHOL DO YOU HAVE ON A TYPICAL DAY: PATIENT DOES NOT DRINK

## 2024-10-29 ASSESSMENT — PAIN - FUNCTIONAL ASSESSMENT: PAIN_FUNCTIONAL_ASSESSMENT: 0-10

## 2024-10-29 ASSESSMENT — HEART SCORE: ECG: NORMAL

## 2024-10-29 NOTE — ED PROVIDER NOTES
[facility] EMERGENCY DEPT  EMERGENCY DEPARTMENT ENCOUNTER       Pt Name: Jeremy Castle  MRN: [id number]  Birthdate 1970  Date of evaluation: 10/29/2024  Provider: Brad Hobson MD   PCP: Piotr Chacko MD  Note Started: 1:38 PM EDT 10/29/24     CHIEF COMPLAINT       Chief Complaint   Patient presents with    Shortness of Breath     Patient ambulatory to triage w c/o SOB, w associated headache acute onset Sun.        HISTORY OF PRESENT ILLNESS: 1 or more elements      History From: Patient, History limited by: none     Jeremy Castel is a 54 y.o. male patient with history of hypertension, here for headache and cough.  Symptoms started 2 days ago.  Has had intermittent headache, which is currently 8 out of 10 in severity.  He denies trauma or neck pain.  Denies fever or chills.  He has some shortness of breath and states that he felt a pressure in his chest  today.  That was located in the left side of chest.  The pain does not radiate to the neck, back, jaw or arms.  Cough is nonproductive.  He denies nausea or diaphoresis.  Denies leg pain or leg edema       Please See [facility] for Additional Details of the HPI/PMH  Nursing Notes were all reviewed and agreed with or any disagreements were addressed in the HPI.     REVIEW OF SYSTEMS        Positives and Pertinent negatives as per HPI.    PAST HISTORY     Past Medical History:  Past Medical History:   Diagnosis Date    Hypertension     Pneumonia 12/2018    Pre-diabetes     Sleep apnea     no cpap       Past Surgical History:  Past Surgical History:   Procedure Laterality Date    COLONOSCOPY N/A 11/1/2022    COLONOSCOPY performed by Jeramy Pop MD at [facility] ENDOSCOPY    COLONOSCOPY  2023       Family History:  Family History   Problem Relation Age of Onset    Cancer Paternal Grandfather         unknown cancer    Cancer Maternal Uncle         unknown cancer    No Known Problems Maternal Grandfather     Prostate Cancer Neg Hx     Colon Cancer Neg Hx     Cancer Maternal

## 2024-10-31 LAB
EKG ATRIAL RATE: 103 BPM
EKG DIAGNOSIS: NORMAL
EKG P AXIS: 32 DEGREES
EKG P-R INTERVAL: 132 MS
EKG Q-T INTERVAL: 334 MS
EKG QRS DURATION: 82 MS
EKG QTC CALCULATION (BAZETT): 437 MS
EKG R AXIS: 56 DEGREES
EKG T AXIS: -2 DEGREES
EKG VENTRICULAR RATE: 103 BPM

## 2025-07-08 SDOH — HEALTH STABILITY: PHYSICAL HEALTH: ON AVERAGE, HOW MANY MINUTES DO YOU ENGAGE IN EXERCISE AT THIS LEVEL?: 50 MIN

## 2025-07-08 SDOH — HEALTH STABILITY: PHYSICAL HEALTH: ON AVERAGE, HOW MANY DAYS PER WEEK DO YOU ENGAGE IN MODERATE TO STRENUOUS EXERCISE (LIKE A BRISK WALK)?: 3 DAYS

## 2025-07-11 ENCOUNTER — OFFICE VISIT (OUTPATIENT)
Facility: CLINIC | Age: 55
End: 2025-07-11
Payer: MEDICAID

## 2025-07-11 VITALS
BODY MASS INDEX: 39.44 KG/M2 | RESPIRATION RATE: 16 BRPM | DIASTOLIC BLOOD PRESSURE: 76 MMHG | TEMPERATURE: 98.2 F | WEIGHT: 245.4 LBS | HEIGHT: 66 IN | SYSTOLIC BLOOD PRESSURE: 130 MMHG | HEART RATE: 68 BPM | OXYGEN SATURATION: 98 %

## 2025-07-11 DIAGNOSIS — Z83.3 FAMILY HISTORY OF TYPE 2 DIABETES MELLITUS: ICD-10-CM

## 2025-07-11 DIAGNOSIS — E66.01 CLASS 2 SEVERE OBESITY WITH SERIOUS COMORBIDITY AND BODY MASS INDEX (BMI) OF 39.0 TO 39.9 IN ADULT, UNSPECIFIED OBESITY TYPE (HCC): ICD-10-CM

## 2025-07-11 DIAGNOSIS — E55.9 VITAMIN D DEFICIENCY: ICD-10-CM

## 2025-07-11 DIAGNOSIS — R79.89 ELEVATED TSH: ICD-10-CM

## 2025-07-11 DIAGNOSIS — E66.812 CLASS 2 SEVERE OBESITY WITH SERIOUS COMORBIDITY AND BODY MASS INDEX (BMI) OF 39.0 TO 39.9 IN ADULT, UNSPECIFIED OBESITY TYPE (HCC): ICD-10-CM

## 2025-07-11 DIAGNOSIS — R09.89 LABILE HYPERTENSION: ICD-10-CM

## 2025-07-11 DIAGNOSIS — R73.03 PREDIABETES: ICD-10-CM

## 2025-07-11 DIAGNOSIS — E29.1 HYPOGONADISM IN MALE: Primary | ICD-10-CM

## 2025-07-11 DIAGNOSIS — M79.10 MYALGIA: ICD-10-CM

## 2025-07-11 PROCEDURE — 3078F DIAST BP <80 MM HG: CPT | Performed by: NURSE PRACTITIONER

## 2025-07-11 PROCEDURE — 99214 OFFICE O/P EST MOD 30 MIN: CPT | Performed by: NURSE PRACTITIONER

## 2025-07-11 PROCEDURE — 3075F SYST BP GE 130 - 139MM HG: CPT | Performed by: NURSE PRACTITIONER

## 2025-07-11 SDOH — ECONOMIC STABILITY: FOOD INSECURITY: WITHIN THE PAST 12 MONTHS, YOU WORRIED THAT YOUR FOOD WOULD RUN OUT BEFORE YOU GOT MONEY TO BUY MORE.: NEVER TRUE

## 2025-07-11 SDOH — ECONOMIC STABILITY: FOOD INSECURITY: WITHIN THE PAST 12 MONTHS, THE FOOD YOU BOUGHT JUST DIDN'T LAST AND YOU DIDN'T HAVE MONEY TO GET MORE.: NEVER TRUE

## 2025-07-11 ASSESSMENT — PATIENT HEALTH QUESTIONNAIRE - PHQ9
SUM OF ALL RESPONSES TO PHQ QUESTIONS 1-9: 0
SUM OF ALL RESPONSES TO PHQ QUESTIONS 1-9: 0
2. FEELING DOWN, DEPRESSED OR HOPELESS: NOT AT ALL
SUM OF ALL RESPONSES TO PHQ QUESTIONS 1-9: 0
1. LITTLE INTEREST OR PLEASURE IN DOING THINGS: NOT AT ALL
SUM OF ALL RESPONSES TO PHQ QUESTIONS 1-9: 0

## 2025-07-11 NOTE — PROGRESS NOTES
Jeremy Castle is a 55 y.o. male     Chief Complaint   Patient presents with    New to provider       /76   Pulse 68   Temp 98.2 °F (36.8 °C) (Oral)   Resp 16   Ht 1.676 m (5' 6\")   Wt 111.3 kg (245 lb 6.4 oz)   SpO2 98%   BMI 39.61 kg/m²     Health Maintenance Due   Topic Date Due    Hepatitis B vaccine (1 of 3 - 19+ 3-dose series) Never done    DTaP/Tdap/Td vaccine (1 - Tdap) Never done    Shingles vaccine (1 of 2) Never done    Pneumococcal 50+ years Vaccine (1 of 1 - PCV) Never done    COVID-19 Vaccine (3 - 2024-25 season) 09/01/2024         \"Have you been to the ER, urgent care clinic since your last visit?  Hospitalized since your last visit?\"    NO    “Have you seen or consulted any other health care providers outside of [facility] since your last visit?”    NO                   
Cholesterol Mother     Heart Attack Mother         in her 50s    Cancer Paternal Grandmother         unknown cancer    Prostate Cancer Neg Hx     Colon Cancer Neg Hx        OBJECTIVE:     /76   Pulse 68   Temp 98.2 °F (36.8 °C) (Oral)   Resp 16   Ht 1.676 m (5' 6\")   Wt 111.3 kg (245 lb 6.4 oz)   SpO2 98%   BMI 39.61 kg/m²     Constitutional: He appears well nourished, of stated age, and dressed appropriately.  Eyes: Sclera anicteric, PERRLA, EOMI  Neck: Supple without lymphadenopathy. Thyroid normal, No JVD or bruits  Respiratory: Clear to ascultation X5, normal inspiratory effort, no adventitious breath sounds.  Cardiovascular: Regular rate and rhythm, no rubs or gallops, PMI not displaced, No thrills  Musculoskeletal: No joint pain or swelling on palpation.   Integumentary: No unusual rashes or suspicious skin lesions noted.   Neuro: Non-focal exam, A & O X 3, DTRs equal and adequate.   Psychiatric: Appropriate affect and demeanor, pleasant and cooperative. Patient's thought content and thought processing appear to be within normal limits.    ASSESSMENT/PLAN:    Diagnosis Orders   1. Hypogonadism in male        2. Myalgia  CK      3. Class 2 severe obesity with serious comorbidity and body mass index (BMI) of 39.0 to 39.9 in adult, unspecified obesity type (HCC)        4. Labile hypertension  CBC with Auto Differential    Comprehensive Metabolic Panel    Lipid Panel      5. Prediabetes  Hemoglobin A1C      6. Vitamin D deficiency  Vitamin D 25 Hydroxy      7. Elevated TSH  TSH + Free T4 Panel      8. Family history of type 2 diabetes mellitus          1: Patient will return in the next week or 2 for fasting labs including: CBC, CMP, lipid panel, CK, hemoglobin A1c, vitamin D level, and TSH with free T4.  2: Once fasting labs have been completed, patient will return later for nonfasting testosterone levels.  3: Patient instructed to focus on healthy lifestyle management with appropriate dietary intake

## 2025-07-15 ENCOUNTER — LAB (OUTPATIENT)
Facility: CLINIC | Age: 55
End: 2025-07-15

## 2025-07-15 DIAGNOSIS — R09.89 LABILE HYPERTENSION: ICD-10-CM

## 2025-07-15 DIAGNOSIS — R73.03 PREDIABETES: ICD-10-CM

## 2025-07-15 DIAGNOSIS — R79.89 ELEVATED TSH: ICD-10-CM

## 2025-07-15 DIAGNOSIS — E55.9 VITAMIN D DEFICIENCY: Primary | ICD-10-CM

## 2025-07-15 DIAGNOSIS — M79.10 MYALGIA: ICD-10-CM

## 2025-07-17 LAB
ALBUMIN SERPL-MCNC: 4.3 G/DL (ref 3.5–5)
ALBUMIN/GLOB SERPL: 1.3 (ref 1.1–2.2)
ALP SERPL-CCNC: 109 U/L (ref 45–117)
ALT SERPL-CCNC: 33 U/L (ref 12–78)
ANION GAP SERPL CALC-SCNC: 9 MMOL/L (ref 2–12)
AST SERPL-CCNC: 30 U/L (ref 15–37)
BILIRUB SERPL-MCNC: 1 MG/DL (ref 0.2–1)
BUN SERPL-MCNC: 18 MG/DL (ref 6–20)
BUN/CREAT SERPL: 14 (ref 12–20)
CALCIUM SERPL-MCNC: 9.1 MG/DL (ref 8.5–10.1)
CHLORIDE SERPL-SCNC: 101 MMOL/L (ref 97–108)
CHOLEST SERPL-MCNC: 173 MG/DL
CK SERPL-CCNC: 257 U/L (ref 39–308)
CO2 SERPL-SCNC: 27 MMOL/L (ref 21–32)
CREAT SERPL-MCNC: 1.33 MG/DL (ref 0.7–1.3)
GLOBULIN SER CALC-MCNC: 3.3 G/DL (ref 2–4)
GLUCOSE SERPL-MCNC: 98 MG/DL (ref 65–100)
HDLC SERPL-MCNC: 84 MG/DL
HDLC SERPL: 2.1 (ref 0–5)
LDLC SERPL CALC-MCNC: 71.4 MG/DL (ref 0–100)
POTASSIUM SERPL-SCNC: 4 MMOL/L (ref 3.5–5.1)
PROT SERPL-MCNC: 7.6 G/DL (ref 6.4–8.2)
SODIUM SERPL-SCNC: 137 MMOL/L (ref 136–145)
TRIGL SERPL-MCNC: 88 MG/DL
VLDLC SERPL CALC-MCNC: 17.6 MG/DL

## 2025-07-18 ENCOUNTER — LAB (OUTPATIENT)
Facility: CLINIC | Age: 55
End: 2025-07-18

## 2025-07-18 DIAGNOSIS — E55.9 VITAMIN D DEFICIENCY: ICD-10-CM

## 2025-07-18 DIAGNOSIS — R73.03 PREDIABETES: ICD-10-CM

## 2025-07-18 DIAGNOSIS — R79.89 ELEVATED TSH: ICD-10-CM

## 2025-07-18 DIAGNOSIS — E29.1 HYPOGONADISM IN MALE: Primary | ICD-10-CM

## 2025-07-18 DIAGNOSIS — R09.89 LABILE HYPERTENSION: ICD-10-CM

## 2025-07-19 LAB
25(OH)D3 SERPL-MCNC: 36.8 NG/ML (ref 30–100)
BASOPHILS # BLD: 0.02 K/UL (ref 0–0.1)
BASOPHILS NFR BLD: 0.4 % (ref 0–1)
DIFFERENTIAL METHOD BLD: NORMAL
EOSINOPHIL # BLD: 0.12 K/UL (ref 0–0.4)
EOSINOPHIL NFR BLD: 2.2 % (ref 0–7)
ERYTHROCYTE [DISTWIDTH] IN BLOOD BY AUTOMATED COUNT: 12.7 % (ref 11.5–14.5)
EST. AVERAGE GLUCOSE BLD GHB EST-MCNC: 126 MG/DL
HBA1C MFR BLD: 6 % (ref 4–5.6)
HCT VFR BLD AUTO: 47.1 % (ref 36.6–50.3)
HGB BLD-MCNC: 15 G/DL (ref 12.1–17)
IMM GRANULOCYTES # BLD AUTO: 0.01 K/UL (ref 0–0.04)
IMM GRANULOCYTES NFR BLD AUTO: 0.2 % (ref 0–0.5)
LYMPHOCYTES # BLD: 2.02 K/UL (ref 0.8–3.5)
LYMPHOCYTES NFR BLD: 36.6 % (ref 12–49)
MCH RBC QN AUTO: 29.4 PG (ref 26–34)
MCHC RBC AUTO-ENTMCNC: 31.8 G/DL (ref 30–36.5)
MCV RBC AUTO: 92.4 FL (ref 80–99)
MONOCYTES # BLD: 0.61 K/UL (ref 0–1)
MONOCYTES NFR BLD: 11.1 % (ref 5–13)
NEUTS SEG # BLD: 2.74 K/UL (ref 1.8–8)
NEUTS SEG NFR BLD: 49.5 % (ref 32–75)
NRBC # BLD: 0 K/UL (ref 0–0.01)
NRBC BLD-RTO: 0 PER 100 WBC
PLATELET # BLD AUTO: 213 K/UL (ref 150–400)
PMV BLD AUTO: 11.2 FL (ref 8.9–12.9)
RBC # BLD AUTO: 5.1 M/UL (ref 4.1–5.7)
T4 FREE SERPL-MCNC: 0.8 NG/DL (ref 0.8–1.5)
TSH SERPL DL<=0.05 MIU/L-ACNC: 2.6 UIU/ML (ref 0.36–3.74)
WBC # BLD AUTO: 5.5 K/UL (ref 4.1–11.1)

## 2025-07-21 ENCOUNTER — RESULTS FOLLOW-UP (OUTPATIENT)
Facility: CLINIC | Age: 55
End: 2025-07-21

## 2025-07-21 DIAGNOSIS — E29.1 HYPOGONADISM IN MALE: Primary | ICD-10-CM

## 2025-07-21 LAB — TESTOST SERPL-MCNC: 150 NG/DL (ref 264–916)

## 2025-07-28 LAB
TESTOST FREE SERPL-MCNC: 4.6 PG/ML (ref 7.2–24)
TESTOST SERPL-MCNC: 150 NG/DL (ref 264–916)

## 2025-07-29 NOTE — TELEPHONE ENCOUNTER
----- Message from GERA MACDONALD LPN sent at 7/29/2025  8:37 AM EDT -----  Spoke to patient and he would like to see a Urologist  ----- Message -----  From: Bonifacio Urban APRN - NP  Sent: 7/29/2025   7:35 AM EDT  To: Obdulia Wilson LPN    Did he want a referral to urology for his testosterone?  That issue did not appear to be addressed with him regarding his results.    ~SDG  ----- Message -----  From: Mikie Ward Incoming [employer]  Sent: 7/19/2025   3:18 AM EDT  To: ESCOBAR Harrison NP  
O positive

## 2025-08-21 ENCOUNTER — APPOINTMENT (OUTPATIENT)
Facility: HOSPITAL | Age: 55
End: 2025-08-21
Payer: MEDICAID

## 2025-08-21 ENCOUNTER — HOSPITAL ENCOUNTER (EMERGENCY)
Facility: HOSPITAL | Age: 55
Discharge: HOME | End: 2025-08-22
Payer: MEDICAID

## 2025-08-21 VITALS
WEIGHT: 243.17 LBS | OXYGEN SATURATION: 95 % | DIASTOLIC BLOOD PRESSURE: 103 MMHG | HEART RATE: 91 BPM | SYSTOLIC BLOOD PRESSURE: 159 MMHG | RESPIRATION RATE: 18 BRPM | TEMPERATURE: 98.2 F | HEIGHT: 65 IN | BODY MASS INDEX: 40.51 KG/M2

## 2025-08-21 DIAGNOSIS — R42 DIZZINESS: Primary | ICD-10-CM

## 2025-08-21 LAB
ALBUMIN SERPL-MCNC: 4.1 G/DL (ref 3.5–5.2)
ALBUMIN/GLOB SERPL: 1.2 (ref 1.1–2.2)
ALP SERPL-CCNC: 104 U/L (ref 40–129)
ALT SERPL-CCNC: 22 U/L (ref 10–50)
ANION GAP SERPL CALC-SCNC: 13 MMOL/L (ref 2–14)
AST SERPL-CCNC: 31 U/L (ref 10–50)
BASOPHILS # BLD: 0.02 K/UL (ref 0–0.1)
BASOPHILS NFR BLD: 0.3 % (ref 0–1)
BILIRUB SERPL-MCNC: 0.4 MG/DL (ref 0–1.2)
BUN SERPL-MCNC: 18 MG/DL (ref 6–20)
BUN/CREAT SERPL: 14 (ref 12–20)
CALCIUM SERPL-MCNC: 9.6 MG/DL (ref 8.6–10)
CHLORIDE SERPL-SCNC: 103 MMOL/L (ref 98–107)
CO2 SERPL-SCNC: 24 MMOL/L (ref 20–29)
CREAT SERPL-MCNC: 1.3 MG/DL (ref 0.7–1.2)
DIFFERENTIAL METHOD BLD: NORMAL
EOSINOPHIL # BLD: 0.08 K/UL (ref 0–0.4)
EOSINOPHIL NFR BLD: 1.1 % (ref 0–7)
ERYTHROCYTE [DISTWIDTH] IN BLOOD BY AUTOMATED COUNT: 12.8 % (ref 11.5–14.5)
GLOBULIN SER CALC-MCNC: 3.3 G/DL (ref 2–4)
GLUCOSE SERPL-MCNC: 98 MG/DL (ref 65–100)
HCT VFR BLD AUTO: 44.2 % (ref 36.6–50.3)
HGB BLD-MCNC: 14.9 G/DL (ref 12.1–17)
IMM GRANULOCYTES # BLD AUTO: 0.03 K/UL (ref 0–0.04)
IMM GRANULOCYTES NFR BLD AUTO: 0.4 % (ref 0–0.5)
LYMPHOCYTES # BLD: 1.9 K/UL (ref 0.8–3.5)
LYMPHOCYTES NFR BLD: 25.4 % (ref 12–49)
MCH RBC QN AUTO: 29.3 PG (ref 26–34)
MCHC RBC AUTO-ENTMCNC: 33.7 G/DL (ref 30–36.5)
MCV RBC AUTO: 86.8 FL (ref 80–99)
MONOCYTES # BLD: 0.77 K/UL (ref 0–1)
MONOCYTES NFR BLD: 10.3 % (ref 5–13)
NEUTS SEG # BLD: 4.69 K/UL (ref 1.8–8)
NEUTS SEG NFR BLD: 62.5 % (ref 32–75)
NRBC # BLD: 0 K/UL (ref 0–0.01)
NRBC BLD-RTO: 0 PER 100 WBC
PLATELET # BLD AUTO: 212 K/UL (ref 150–400)
PMV BLD AUTO: 10.2 FL (ref 8.9–12.9)
POTASSIUM SERPL-SCNC: 4 MMOL/L (ref 3.5–5.1)
PROT SERPL-MCNC: 7.4 G/DL (ref 6.4–8.3)
RBC # BLD AUTO: 5.09 M/UL (ref 4.1–5.7)
SODIUM SERPL-SCNC: 140 MMOL/L (ref 136–145)
WBC # BLD AUTO: 7.5 K/UL (ref 4.1–11.1)

## 2025-08-21 PROCEDURE — 6370000000 HC RX 637 (ALT 250 FOR IP)

## 2025-08-21 PROCEDURE — 93005 ELECTROCARDIOGRAM TRACING: CPT

## 2025-08-21 PROCEDURE — 71046 X-RAY EXAM CHEST 2 VIEWS: CPT

## 2025-08-21 PROCEDURE — 99285 EMERGENCY DEPT VISIT HI MDM: CPT

## 2025-08-21 PROCEDURE — 70496 CT ANGIOGRAPHY HEAD: CPT

## 2025-08-21 PROCEDURE — 36415 COLL VENOUS BLD VENIPUNCTURE: CPT

## 2025-08-21 PROCEDURE — 80053 COMPREHEN METABOLIC PANEL: CPT

## 2025-08-21 PROCEDURE — 70450 CT HEAD/BRAIN W/O DYE: CPT

## 2025-08-21 PROCEDURE — 2580000003 HC RX 258

## 2025-08-21 PROCEDURE — 6360000004 HC RX CONTRAST MEDICATION: Performed by: RADIOLOGY

## 2025-08-21 PROCEDURE — 85025 COMPLETE CBC W/AUTO DIFF WBC: CPT

## 2025-08-21 RX ORDER — MECLIZINE HCL 12.5 MG 12.5 MG/1
25 TABLET ORAL
Status: COMPLETED | OUTPATIENT
Start: 2025-08-21 | End: 2025-08-21

## 2025-08-21 RX ORDER — IOPAMIDOL 755 MG/ML
100 INJECTION, SOLUTION INTRAVASCULAR
Status: COMPLETED | OUTPATIENT
Start: 2025-08-21 | End: 2025-08-21

## 2025-08-21 RX ORDER — SODIUM CHLORIDE, SODIUM LACTATE, POTASSIUM CHLORIDE, AND CALCIUM CHLORIDE .6; .31; .03; .02 G/100ML; G/100ML; G/100ML; G/100ML
1000 INJECTION, SOLUTION INTRAVENOUS ONCE
Status: COMPLETED | OUTPATIENT
Start: 2025-08-21 | End: 2025-08-22

## 2025-08-21 RX ADMIN — SODIUM CHLORIDE, SODIUM LACTATE, POTASSIUM CHLORIDE, AND CALCIUM CHLORIDE 1000 ML: .6; .31; .03; .02 INJECTION, SOLUTION INTRAVENOUS at 23:40

## 2025-08-21 RX ADMIN — IOPAMIDOL 100 ML: 755 INJECTION, SOLUTION INTRAVENOUS at 23:13

## 2025-08-21 RX ADMIN — MECLIZINE 25 MG: 12.5 TABLET ORAL at 23:39

## 2025-08-21 ASSESSMENT — PAIN SCALES - GENERAL: PAINLEVEL_OUTOF10: 0

## 2025-08-22 RX ORDER — MECLIZINE HYDROCHLORIDE 25 MG/1
25 TABLET ORAL 3 TIMES DAILY PRN
Qty: 15 TABLET | Refills: 0 | Status: SHIPPED | OUTPATIENT
Start: 2025-08-22 | End: 2025-09-01

## 2025-08-24 LAB
EKG ATRIAL RATE: 93 BPM
EKG DIAGNOSIS: NORMAL
EKG P AXIS: 32 DEGREES
EKG P-R INTERVAL: 136 MS
EKG Q-T INTERVAL: 364 MS
EKG QRS DURATION: 78 MS
EKG QTC CALCULATION (BAZETT): 452 MS
EKG R AXIS: 41 DEGREES
EKG T AXIS: 12 DEGREES
EKG VENTRICULAR RATE: 93 BPM

## (undated) DEVICE — SYR 10ML LUER LOK 1/5ML GRAD --

## (undated) DEVICE — 1200 GUARD II KIT W/5MM TUBE W/O VAC TUBE: Brand: GUARDIAN

## (undated) DEVICE — SOLIDIFIER FLD 2OZ 1500CC N DISINF IN BTL DISP SAFESORB

## (undated) DEVICE — ELECTRODE,RADIOTRANSLUCENT,FOAM,5PK: Brand: MEDLINE

## (undated) DEVICE — TOWEL 4 PLY TISS 19X30 SUE WHT

## (undated) DEVICE — SET ADMIN 16ML TBNG L100IN 2 Y INJ SITE IV PIGGY BK DISP (ORDER IN MULIPLES OF 48)

## (undated) DEVICE — HYPODERMIC SAFETY NEEDLE: Brand: MAGELLAN

## (undated) DEVICE — Z DISCONTINUED PER MEDLINE LINE GAS SAMPLING O2/CO2 LNG AD 13 FT NSL W/ TBNG FILTERLINE

## (undated) DEVICE — NEONATAL-ADULT SPO2 SENSOR: Brand: NELLCOR

## (undated) DEVICE — Device

## (undated) DEVICE — SYR 3ML LL TIP 1/10ML GRAD --

## (undated) DEVICE — CATH IV AUTOGRD BC PNK 20GA 25 -- INSYTE

## (undated) DEVICE — BASIN EMSIS 16OZ GRAPHITE PLAS KID SHP MOLD GRAD FOR ORAL